# Patient Record
Sex: MALE | Race: WHITE | NOT HISPANIC OR LATINO | Employment: OTHER | URBAN - METROPOLITAN AREA
[De-identification: names, ages, dates, MRNs, and addresses within clinical notes are randomized per-mention and may not be internally consistent; named-entity substitution may affect disease eponyms.]

---

## 2017-10-12 ENCOUNTER — OFFICE VISIT (OUTPATIENT)
Dept: ENDOCRINOLOGY | Age: 67
End: 2017-10-12

## 2017-10-12 VITALS
SYSTOLIC BLOOD PRESSURE: 122 MMHG | BODY MASS INDEX: 30.45 KG/M2 | WEIGHT: 205.6 LBS | DIASTOLIC BLOOD PRESSURE: 50 MMHG | HEART RATE: 76 BPM | OXYGEN SATURATION: 96 % | HEIGHT: 69 IN

## 2017-10-12 DIAGNOSIS — Z95.820 S/P ANGIOPLASTY WITH STENT: ICD-10-CM

## 2017-10-12 DIAGNOSIS — E78.5 HYPERLIPIDEMIA, UNSPECIFIED HYPERLIPIDEMIA TYPE: ICD-10-CM

## 2017-10-12 DIAGNOSIS — E11.9 TYPE 2 DIABETES MELLITUS WITHOUT COMPLICATION, WITHOUT LONG-TERM CURRENT USE OF INSULIN (HCC): Primary | ICD-10-CM

## 2017-10-12 DIAGNOSIS — I25.10 CORONARY ARTERY DISEASE INVOLVING NATIVE CORONARY ARTERY OF NATIVE HEART WITHOUT ANGINA PECTORIS: ICD-10-CM

## 2017-10-12 DIAGNOSIS — R97.20 ELEVATED PSA: ICD-10-CM

## 2017-10-12 PROCEDURE — 99214 OFFICE O/P EST MOD 30 MIN: CPT | Performed by: INTERNAL MEDICINE

## 2017-10-12 RX ORDER — CLOPIDOGREL BISULFATE 75 MG/1
75 TABLET ORAL DAILY
COMMUNITY
Start: 2017-09-16

## 2017-10-12 RX ORDER — ROSUVASTATIN CALCIUM 20 MG/1
20 TABLET, COATED ORAL NIGHTLY
COMMUNITY
End: 2017-10-18 | Stop reason: SDUPTHER

## 2017-10-12 RX ORDER — GLIMEPIRIDE 2 MG/1
2 TABLET ORAL
Qty: 90 TABLET | Refills: 2 | Status: SHIPPED | OUTPATIENT
Start: 2017-10-12 | End: 2017-10-30 | Stop reason: SDUPTHER

## 2017-10-12 RX ORDER — LISINOPRIL 20 MG/1
20 TABLET ORAL DAILY
COMMUNITY
Start: 2017-09-16 | End: 2022-12-22 | Stop reason: SDUPTHER

## 2017-10-12 RX ORDER — EZETIMIBE 10 MG/1
10 TABLET ORAL DAILY
COMMUNITY
Start: 2017-09-16 | End: 2020-01-03 | Stop reason: ALTCHOICE

## 2017-10-12 RX ORDER — MULTIPLE VITAMINS W/ MINERALS TAB 9MG-400MCG
1 TAB ORAL DAILY
COMMUNITY

## 2017-10-12 RX ORDER — LANOLIN ALCOHOL/MO/W.PET/CERES
500 CREAM (GRAM) TOPICAL NIGHTLY
COMMUNITY
End: 2017-10-18

## 2017-10-12 RX ORDER — CHLORAL HYDRATE 500 MG
1000 CAPSULE ORAL 2 TIMES DAILY WITH MEALS
COMMUNITY

## 2017-10-12 RX ORDER — GLIMEPIRIDE 2 MG/1
1 TABLET ORAL
COMMUNITY
Start: 2017-07-29 | End: 2017-10-12 | Stop reason: SDUPTHER

## 2017-10-12 RX ORDER — CARVEDILOL 25 MG/1
25 TABLET ORAL 2 TIMES DAILY WITH MEALS
COMMUNITY
Start: 2017-09-16 | End: 2018-11-27

## 2017-10-12 NOTE — PROGRESS NOTES
Subjective   Dennis Arnold is a 67 y.o. male.     HPI Comments: New pt ref by dr Milka Peace for dm / not testing bs / last dm eye exam mar 2017 / last dm foot exam today with dr De La Cruz     Diabetes   Associated symptoms include fatigue.      Patient is a 67-year-old male who was referred for diabetes consultation by Dr. Peace.  Patient has known diabetes mellitus since 2010.  He has been on glimepiride 2 mg once a day and metformin 1000 mg twice a day.  He checks his blood sugar once every other week and runs above 200.  He denies any hypoglycemic episodes.  He is occasionally noncompliant with his diet.  He denies any significant weight change.  He does not exercise regularly.  His last meal was at 8:30 AM.    His last eye examination was in February 2017 and there is no retinopathy.  He denies any numbness, tingling or burning in his hands or feet he denies any associated nephropathy.    He has known coronary artery disease and hypertension he had a previous three-vessel coronary artery bypass and angioplasty with stent.  He is on Coreg and lisinopril.  He denies chest pain or shortness of breath.  He follows with Dr. Sullivan.    He has hyperlipidemia and has been on Trilipix 135 mg once a day, Zetia 10 mg once a day, niacin 500 mg every evening, Crestor 20 mg once a day, and fish oil 1000 mg twice a day.  He denies any flushing or myalgia.    He has elevated PSA and will have a prostate biopsy done by Dr. Azul later this month.  He denies urinary complaints.    He has never had a colonoscopy.  He denies bowel changes.  He denies melena or hematochezia.    The following portions of the patient's history were reviewed and updated as appropriate: allergies, current medications, past family history, past medical history, past social history, past surgical history and problem list.    Review of Systems   Constitutional: Positive for fatigue.   HENT: Negative.    Eyes: Negative.    Respiratory: Negative.   "  Cardiovascular: Negative.    Gastrointestinal: Negative.    Endocrine: Negative.    Genitourinary: Positive for difficulty urinating, frequency and urgency.   Musculoskeletal: Negative.    Skin: Negative.    Allergic/Immunologic: Negative.    Neurological: Negative.    Hematological: Negative.    Psychiatric/Behavioral: Negative.        Objective      Vitals:    10/12/17 1201   BP: 122/50   BP Location: Right arm   Patient Position: Sitting   Cuff Size: Large Adult   Pulse: 76   SpO2: 96%   Weight: 205 lb 9.6 oz (93.3 kg)   Height: 69\" (175.3 cm)     Physical Exam   Constitutional: He is oriented to person, place, and time. He appears well-developed and well-nourished. No distress.   HENT:   Head: Normocephalic.   Nose: Nose normal.   Mouth/Throat: No oropharyngeal exudate.   Eyes: Conjunctivae and EOM are normal. Right eye exhibits no discharge. Left eye exhibits no discharge. No scleral icterus.   Neck: Neck supple. No JVD present. No tracheal deviation present. No thyromegaly present.   No carotid bruits   Cardiovascular: Normal rate, regular rhythm, normal heart sounds and intact distal pulses.  Exam reveals no gallop and no friction rub.    No murmur heard.  Pulmonary/Chest: Effort normal and breath sounds normal. No respiratory distress. He has no wheezes. He has no rales. He exhibits no tenderness.   Abdominal: Soft. Bowel sounds are normal. He exhibits no distension and no mass. There is no tenderness.   Musculoskeletal: Normal range of motion. He exhibits no edema, tenderness or deformity.   No plantar ulcers.  No xanthomas   Lymphadenopathy:     He has no cervical adenopathy.   Neurological: He is alert and oriented to person, place, and time. He displays normal reflexes. Coordination normal.   Intact light touch on lower extremities   Skin: Skin is warm and dry. No rash noted. No erythema. No pallor.   Psychiatric: He has a normal mood and affect. His behavior is normal.     No results found for any " previous visit.  Assessment/Plan   Dennis was seen today for diabetes.    Diagnoses and all orders for this visit:    Type 2 diabetes mellitus without complication, without long-term current use of insulin  -     Comprehensive Metabolic Panel  -     Hemoglobin A1c  -     TSH  -     T4, Free  -     Urinalysis With / Microscopic If Indicated - Urine, Clean Catch  -     C-Peptide  -     Glutamic Acid Decarboxylase  -     Ambulatory Referral to Diabetic Education  -     Microalbumin / Creatinine Urine Ratio - Urine, Clean Catch  -     glimepiride (AMARYL) 2 MG tablet; Take 1 tablet by mouth Every Morning Before Breakfast.    Hyperlipidemia, unspecified hyperlipidemia type  -     Lipid Panel  -     TSH  -     T4, Free    Coronary artery disease involving native coronary artery of native heart without angina pectoris    S/P angioplasty with stent    Elevated PSA  -     Urinalysis With / Microscopic If Indicated - Urine, Clean Catch      Increase glimepiride to 2 mg once a day.  Continue metformin 1000 mg twice a day.  Check blood sugars daily, alternating fasting with 2 hours after main meal to day.  Call with blood sugars in one week.  Appointment with diabetes educator.  Check hemoglobin A1c and urine microalbumin.  Check thyroid function tests and lipid profile.  Consider simplifying lipid therapy.  Continue Coreg and lisinopril    Advised to get a colonoscopy.    Advised to get Pneumovax and shingles vaccine and tetanus whooping cough vaccine.    Send copy of my notes and labs to Dr. Peace, Dr. Sullivan, and Dr. Azul    RTC 4 mos

## 2017-10-17 LAB
ALBUMIN SERPL-MCNC: 4.8 G/DL (ref 3.5–5.2)
ALBUMIN/CREAT UR: 111.8 MG/G CREAT (ref 0–30)
ALBUMIN/GLOB SERPL: 1.7 G/DL
ALP SERPL-CCNC: 86 U/L (ref 39–117)
ALT SERPL-CCNC: 33 U/L (ref 1–41)
APPEARANCE UR: CLEAR
AST SERPL-CCNC: 38 U/L (ref 1–40)
BACTERIA #/AREA URNS HPF: NORMAL /HPF
BILIRUB SERPL-MCNC: 0.9 MG/DL (ref 0.1–1.2)
BILIRUB UR QL STRIP: NEGATIVE
BUN SERPL-MCNC: 15 MG/DL (ref 8–23)
BUN/CREAT SERPL: 16.9 (ref 7–25)
C PEPTIDE SERPL-MCNC: 7.6 NG/ML (ref 1.1–4.4)
CALCIUM SERPL-MCNC: 9.6 MG/DL (ref 8.6–10.5)
CASTS URNS MICRO: NORMAL
CHLORIDE SERPL-SCNC: 97 MMOL/L (ref 98–107)
CHOLEST SERPL-MCNC: 149 MG/DL (ref 0–200)
CO2 SERPL-SCNC: 24.7 MMOL/L (ref 22–29)
COLOR UR: YELLOW
CREAT SERPL-MCNC: 0.89 MG/DL (ref 0.76–1.27)
CREAT UR-MCNC: 112.2 MG/DL
EPI CELLS #/AREA URNS HPF: NORMAL /HPF
GAD65 AB SER IA-ACNC: <5 U/ML (ref 0–5)
GLOBULIN SER CALC-MCNC: 2.8 GM/DL
GLUCOSE SERPL-MCNC: 292 MG/DL (ref 65–99)
GLUCOSE UR QL: ABNORMAL
HBA1C MFR BLD: 8.9 % (ref 4.8–5.6)
HDLC SERPL-MCNC: 38 MG/DL (ref 40–60)
HGB UR QL STRIP: NEGATIVE
KETONES UR QL STRIP: NEGATIVE
LDLC SERPL CALC-MCNC: ABNORMAL MG/DL
LEUKOCYTE ESTERASE UR QL STRIP: NEGATIVE
MICROALBUMIN UR-MCNC: 125.4 UG/ML
NITRITE UR QL STRIP: NEGATIVE
PH UR STRIP: 5.5 [PH] (ref 5–8)
POTASSIUM SERPL-SCNC: 4.6 MMOL/L (ref 3.5–5.2)
PROT SERPL-MCNC: 7.6 G/DL (ref 6–8.5)
PROT UR QL STRIP: ABNORMAL
RBC #/AREA URNS HPF: NORMAL /HPF
SODIUM SERPL-SCNC: 137 MMOL/L (ref 136–145)
SP GR UR: ABNORMAL (ref 1–1.03)
T4 FREE SERPL-MCNC: 1.27 NG/DL (ref 0.93–1.7)
TRIGL SERPL-MCNC: 427 MG/DL (ref 0–150)
TSH SERPL DL<=0.005 MIU/L-ACNC: 1.88 MIU/ML (ref 0.27–4.2)
UROBILINOGEN UR STRIP-MCNC: ABNORMAL MG/DL
VLDLC SERPL CALC-MCNC: ABNORMAL MG/DL
WBC #/AREA URNS HPF: NORMAL /HPF

## 2017-10-18 RX ORDER — ROSUVASTATIN CALCIUM 40 MG/1
40 TABLET, COATED ORAL NIGHTLY
Qty: 30 TABLET | Refills: 5 | Status: SHIPPED | OUTPATIENT
Start: 2017-10-18 | End: 2018-03-07 | Stop reason: ALTCHOICE

## 2017-10-30 DIAGNOSIS — E11.9 TYPE 2 DIABETES MELLITUS WITHOUT COMPLICATION, WITHOUT LONG-TERM CURRENT USE OF INSULIN (HCC): ICD-10-CM

## 2017-10-30 RX ORDER — GLIMEPIRIDE 4 MG/1
4 TABLET ORAL
Qty: 90 TABLET | Refills: 1 | Status: SHIPPED | OUTPATIENT
Start: 2017-10-30 | End: 2018-03-09 | Stop reason: DRUGHIGH

## 2017-11-13 ENCOUNTER — HOSPITAL ENCOUNTER (OUTPATIENT)
Dept: DIABETES SERVICES | Facility: HOSPITAL | Age: 67
Discharge: HOME | End: 2017-11-13
Attending: INTERNAL MEDICINE | Admitting: INTERNAL MEDICINE

## 2017-11-13 PROCEDURE — G0109 DIAB MANAGE TRN IND/GROUP: HCPCS

## 2018-03-02 ENCOUNTER — OFFICE VISIT (OUTPATIENT)
Dept: ENDOCRINOLOGY | Age: 68
End: 2018-03-02

## 2018-03-02 VITALS
OXYGEN SATURATION: 96 % | HEART RATE: 67 BPM | WEIGHT: 201.6 LBS | BODY MASS INDEX: 29.86 KG/M2 | SYSTOLIC BLOOD PRESSURE: 126 MMHG | DIASTOLIC BLOOD PRESSURE: 66 MMHG | HEIGHT: 69 IN

## 2018-03-02 DIAGNOSIS — E78.5 HYPERLIPIDEMIA, UNSPECIFIED HYPERLIPIDEMIA TYPE: ICD-10-CM

## 2018-03-02 DIAGNOSIS — E11.29 TYPE 2 DIABETES MELLITUS WITH ALBUMINURIA (HCC): Primary | ICD-10-CM

## 2018-03-02 DIAGNOSIS — R80.9 TYPE 2 DIABETES MELLITUS WITH ALBUMINURIA (HCC): Primary | ICD-10-CM

## 2018-03-02 DIAGNOSIS — I25.10 CORONARY ARTERY DISEASE INVOLVING NATIVE CORONARY ARTERY OF NATIVE HEART WITHOUT ANGINA PECTORIS: ICD-10-CM

## 2018-03-02 DIAGNOSIS — Z95.820 S/P ANGIOPLASTY WITH STENT: ICD-10-CM

## 2018-03-02 DIAGNOSIS — I10 ESSENTIAL HYPERTENSION: ICD-10-CM

## 2018-03-02 PROBLEM — C61 PROSTATE CANCER (HCC): Status: ACTIVE | Noted: 2017-10-12

## 2018-03-02 LAB
ALBUMIN SERPL-MCNC: 4.6 G/DL (ref 3.5–5.2)
ALBUMIN/GLOB SERPL: 1.6 G/DL
ALP SERPL-CCNC: 112 U/L (ref 39–117)
ALT SERPL-CCNC: 41 U/L (ref 1–41)
AST SERPL-CCNC: 29 U/L (ref 1–40)
BILIRUB SERPL-MCNC: 0.6 MG/DL (ref 0.1–1.2)
BUN SERPL-MCNC: 18 MG/DL (ref 8–23)
BUN/CREAT SERPL: 25.7 (ref 7–25)
CALCIUM SERPL-MCNC: 9.5 MG/DL (ref 8.6–10.5)
CHLORIDE SERPL-SCNC: 100 MMOL/L (ref 98–107)
CHOLEST SERPL-MCNC: 193 MG/DL (ref 0–200)
CO2 SERPL-SCNC: 25.5 MMOL/L (ref 22–29)
CREAT SERPL-MCNC: 0.7 MG/DL (ref 0.76–1.27)
GFR SERPLBLD CREATININE-BSD FMLA CKD-EPI: 113 ML/MIN/1.73
GFR SERPLBLD CREATININE-BSD FMLA CKD-EPI: 137 ML/MIN/1.73
GLOBULIN SER CALC-MCNC: 2.9 GM/DL
GLUCOSE SERPL-MCNC: 202 MG/DL (ref 65–99)
HBA1C MFR BLD: 8.3 % (ref 4.8–5.6)
HDLC SERPL-MCNC: 50 MG/DL (ref 40–60)
INTERPRETATION: NORMAL
LDLC SERPL CALC-MCNC: 63 MG/DL (ref 0–100)
Lab: NORMAL
POTASSIUM SERPL-SCNC: 4.6 MMOL/L (ref 3.5–5.2)
PROT SERPL-MCNC: 7.5 G/DL (ref 6–8.5)
SODIUM SERPL-SCNC: 139 MMOL/L (ref 136–145)
T4 FREE SERPL-MCNC: 1.22 NG/DL (ref 0.93–1.7)
TRIGL SERPL-MCNC: 398 MG/DL (ref 0–150)
TSH SERPL DL<=0.005 MIU/L-ACNC: 1.7 MIU/ML (ref 0.27–4.2)
VLDLC SERPL CALC-MCNC: 79.6 MG/DL (ref 5–40)

## 2018-03-02 PROCEDURE — 99214 OFFICE O/P EST MOD 30 MIN: CPT | Performed by: INTERNAL MEDICINE

## 2018-03-02 NOTE — PROGRESS NOTES
Subjective   Dennis Arnold is a 67 y.o. male.     HPI Comments: F/u for dm 2, hyperlipidemia ,CAD/testing bs   2 x day / last dm eye exam Mar 2017/ last dm foot exam today with dr Lutz     Diabetes   Associated symptoms include fatigue.   Coronary Artery Disease   Risk factors include hyperlipidemia.   Hyperlipidemia        Patient is a 67-year-old male who was referred for diabetes consultation by Dr. Peace.  Patient has known diabetes mellitus since 2010.  He has been on glimepiride 2 mg once a day and metformin 1000 mg twice a day.  -190.  He denies any hypoglycemic episodes.  He is occasionally noncompliant with his diet.  He denies any significant weight change.  He does not exercise regularly.  His last meal was last night.     His last eye examination was in February 2017 and there is no retinopathy.  He denies any numbness, tingling or burning in his hands or feet.   He has albuminuria and urine sample taken in October 2017.  He is on lisinopril.     He has known coronary artery disease and hypertension.  He had a previous three-vessel coronary artery bypass and angioplasty with stent.  He is on Coreg and lisinopril.  He denies chest pain or shortness of breath.  He follows with Dr. Sullivan.     He has hyperlipidemia and has been on Trilipix 135 mg once a day, and  Crestor 40 mg once a day..  He denies any myalgia.     He was diagnosed to have prostate cancer and is on androgen deprivation therapy through Dr. Azul.  He will undergo external radiation therapy.  He has been having hot flushes     He has never had a colonoscopy.  He denies bowel changes.  He denies melena or hematochezia.      The following portions of the patient's history were reviewed and updated as appropriate: allergies, current medications, past family history, past medical history, past social history, past surgical history and problem list.    Review of Systems   Constitutional: Positive for fatigue.   HENT: Negative.   "  Eyes: Negative.    Respiratory: Negative.    Cardiovascular: Negative.    Gastrointestinal: Negative.    Endocrine: Negative.    Genitourinary: Negative.    Musculoskeletal: Negative.    Skin: Negative.    Allergic/Immunologic: Negative.    Neurological: Negative.    Hematological: Negative.    Psychiatric/Behavioral: Negative.        Objective      Vitals:    03/02/18 0950   BP: 126/66   BP Location: Right arm   Patient Position: Sitting   Cuff Size: Large Adult   Pulse: 67   SpO2: 96%   Weight: 91.4 kg (201 lb 9.6 oz)   Height: 175.3 cm (69.02\")     Physical Exam   Constitutional: He is oriented to person, place, and time. He appears well-developed and well-nourished. No distress.   HENT:   Head: Normocephalic.   Nose: Nose normal.   Mouth/Throat: No oropharyngeal exudate.   Eyes: Conjunctivae and EOM are normal. Right eye exhibits no discharge. Left eye exhibits no discharge. No scleral icterus.   Neck: Neck supple. No JVD present. No tracheal deviation present. No thyromegaly present.   Cardiovascular: Normal rate, regular rhythm, normal heart sounds and intact distal pulses.  Exam reveals no gallop and no friction rub.    No murmur heard.  Pulmonary/Chest: Effort normal and breath sounds normal. No respiratory distress. He has no wheezes. He has no rales.   Abdominal: Soft. Bowel sounds are normal. He exhibits no distension and no mass. There is no tenderness.   Musculoskeletal: Normal range of motion. He exhibits no edema, tenderness or deformity.   Lymphadenopathy:     He has no cervical adenopathy.   Neurological: He is alert and oriented to person, place, and time. He has normal reflexes.   Intact light touch on both lower extremities   Skin: Skin is warm and dry. No rash noted. No erythema.   Psychiatric: He has a normal mood and affect. His behavior is normal.     Office Visit on 10/12/2017   Component Date Value Ref Range Status   • Glucose 10/12/2017 292* 65 - 99 mg/dL Final   • BUN 10/12/2017 15  8 - " 23 mg/dL Final   • Creatinine 10/12/2017 0.89  0.76 - 1.27 mg/dL Final   • eGFR Non  Am 10/12/2017 86  >60 mL/min/1.73 Final   • eGFR African Am 10/12/2017 104  >60 mL/min/1.73 Final   • BUN/Creatinine Ratio 10/12/2017 16.9  7.0 - 25.0 Final   • Sodium 10/12/2017 137  136 - 145 mmol/L Final   • Potassium 10/12/2017 4.6  3.5 - 5.2 mmol/L Final   • Chloride 10/12/2017 97* 98 - 107 mmol/L Final   • Total CO2 10/12/2017 24.7  22.0 - 29.0 mmol/L Final   • Calcium 10/12/2017 9.6  8.6 - 10.5 mg/dL Final   • Total Protein 10/12/2017 7.6  6.0 - 8.5 g/dL Final   • Albumin 10/12/2017 4.80  3.50 - 5.20 g/dL Final   • Globulin 10/12/2017 2.8  gm/dL Final   • A/G Ratio 10/12/2017 1.7  g/dL Final   • Total Bilirubin 10/12/2017 0.9  0.1 - 1.2 mg/dL Final   • Alkaline Phosphatase 10/12/2017 86  39 - 117 U/L Final   • AST (SGOT) 10/12/2017 38  1 - 40 U/L Final   • ALT (SGPT) 10/12/2017 33  1 - 41 U/L Final   • Total Cholesterol 10/12/2017 149  0 - 200 mg/dL Final   • Triglycerides 10/12/2017 427* 0 - 150 mg/dL Final   • HDL Cholesterol 10/12/2017 38* 40 - 60 mg/dL Final   • VLDL Cholesterol 10/12/2017 CANCELED  mg/dL Final-Edited    Comment: Test not performed  Unable to calculate    Result canceled by the ancillary     • LDL Cholesterol  10/12/2017 CANCELED  mg/dL Final-Edited    Comment: Test not performed  Unable to calculate    Result canceled by the ancillary     • Hemoglobin A1C 10/12/2017 8.90* 4.80 - 5.60 % Final    Comment: Hemoglobin A1C Ranges:  Increased Risk for Diabetes  5.7% to 6.4%  Diabetes                     >= 6.5%  Diabetic Goal                < 7.0%     • TSH 10/12/2017 1.880  0.270 - 4.200 mIU/mL Final   • Free T4 10/12/2017 1.27  0.93 - 1.70 ng/dL Final   • Specific Gravity, UA 10/12/2017 Comment  1.005 - 1.030 Final    >=1.030   • pH, UA 10/12/2017 5.5  5.0 - 8.0 Final   • Color, UA 10/12/2017 Yellow   Final    REFERENCE RANGE: Yellow, Straw   • Appearance, UA 10/12/2017 Clear  Clear Final   •  Leukocytes, UA 10/12/2017 Negative  Negative Final   • Protein 10/12/2017 See below:* Negative Final    30 mg/dL (1+)   • Glucose, UA 10/12/2017 See below:* Negative Final    >=1000 mg/dL (3+)   • Ketones 10/12/2017 Negative  Negative Final   • Blood, UA 10/12/2017 Negative  Negative Final   • Bilirubin, UA 10/12/2017 Negative  Negative Final   • Urobilinogen, UA 10/12/2017 Comment   Final    Comment: 1.0 E.U./dL  REFERENCE RANGE: 0.2 - 1.0 E.U./dL     • Nitrite, UA 10/12/2017 Negative  Negative Final   • C-Peptide 10/12/2017 7.6* 1.1 - 4.4 ng/mL Final    C-Peptide reference interval is for fasting patients.   • PENNY-65 10/12/2017 <5.0  0.0 - 5.0 U/mL Final   • Creatinine, Urine 10/12/2017 112.2  Not Estab. mg/dL Final   • Microalbumin, Urine 10/12/2017 125.4  Not Estab. ug/mL Final   • Microalbumin/Creatinine Ratio 10/12/2017 111.8* 0.0 - 30.0 mg/g creat Final   • WBC, UA 10/12/2017 0-2  /hpf Final    REFERENCE RANGE: None Seen, 0-2   • RBC, UA 10/12/2017 0-2  /hpf Final    REFERENCE RANGE: None Seen, 0-2   • Epithelial Cells (non renal) 10/12/2017 0-2  /hpf Final    REFERENCE RANGE: None Seen, 0-2   • Cast Type 10/12/2017 Comment   Final    HYALINE CASTS, UA            3-6              /LPF       None Seen  N   • Bacteria, UA 10/12/2017 Comment  None Seen /hpf Final    None Seen     Assessment/Plan   Dennis was seen today for diabetes, coronary artery disease and hyperlipidemia.    Diagnoses and all orders for this visit:    Type 2 diabetes mellitus with albuminuria  -     Comprehensive Metabolic Panel  -     Lipid Panel  -     Hemoglobin A1c  -     TSH  -     T4, Free    Hyperlipidemia, unspecified hyperlipidemia type  -     Comprehensive Metabolic Panel  -     Lipid Panel  -     TSH  -     T4, Free    Coronary artery disease involving native coronary artery of native heart without angina pectoris    S/P angioplasty with stent    Essential hypertension  -     Comprehensive Metabolic Panel      Continue glimepiride  2 mg once a day and metformin 1000 mg twice a day.  Continue Crestor and fenofibrate.  Continue lisinopril, and Coreg.  Advised to get a colonoscopy in the near future.    Send copy of my notes and labs to Dr. Peace, Dr. Azul, and Dr. Sullivan.    RTC 4 mos

## 2018-03-07 ENCOUNTER — PRIOR AUTHORIZATION (OUTPATIENT)
Dept: ENDOCRINOLOGY | Age: 68
End: 2018-03-07

## 2018-03-07 DIAGNOSIS — I10 ESSENTIAL HYPERTENSION: Primary | ICD-10-CM

## 2018-03-07 NOTE — TELEPHONE ENCOUNTER
PT'S PA FOR JARDIANCE WAS SUBMITTED TO [employer] ON 3/8/17 AND WAS APPROVED ON 3/9/18. PHARMACY WAS NOTIFIED.

## 2018-03-09 DIAGNOSIS — E11.9 TYPE 2 DIABETES MELLITUS WITHOUT COMPLICATION, WITHOUT LONG-TERM CURRENT USE OF INSULIN (HCC): ICD-10-CM

## 2018-03-09 RX ORDER — GLIMEPIRIDE 4 MG/1
4 TABLET ORAL 2 TIMES DAILY
Qty: 90 TABLET | Refills: 1
Start: 2018-03-09 | End: 2018-11-27 | Stop reason: SDUPTHER

## 2018-03-15 ENCOUNTER — RESULTS ENCOUNTER (OUTPATIENT)
Dept: ENDOCRINOLOGY | Age: 68
End: 2018-03-15

## 2018-03-15 DIAGNOSIS — I10 ESSENTIAL HYPERTENSION: ICD-10-CM

## 2018-04-09 DIAGNOSIS — E11.9 TYPE 2 DIABETES MELLITUS WITHOUT COMPLICATION, WITHOUT LONG-TERM CURRENT USE OF INSULIN (HCC): ICD-10-CM

## 2018-04-09 RX ORDER — GLIMEPIRIDE 4 MG/1
4 TABLET ORAL
Qty: 90 TABLET | Refills: 0 | Status: SHIPPED | OUTPATIENT
Start: 2018-04-09 | End: 2018-07-11 | Stop reason: SDUPTHER

## 2018-07-11 ENCOUNTER — OFFICE VISIT (OUTPATIENT)
Dept: ENDOCRINOLOGY | Age: 68
End: 2018-07-11

## 2018-07-11 VITALS
DIASTOLIC BLOOD PRESSURE: 72 MMHG | HEIGHT: 69 IN | WEIGHT: 200 LBS | HEART RATE: 70 BPM | BODY MASS INDEX: 29.62 KG/M2 | OXYGEN SATURATION: 98 % | SYSTOLIC BLOOD PRESSURE: 124 MMHG

## 2018-07-11 DIAGNOSIS — R68.89 ABNORMAL ENDOCRINE LABORATORY TEST FINDING: ICD-10-CM

## 2018-07-11 DIAGNOSIS — I10 ESSENTIAL HYPERTENSION: ICD-10-CM

## 2018-07-11 DIAGNOSIS — R80.9 TYPE 2 DIABETES MELLITUS WITH ALBUMINURIA (HCC): Primary | ICD-10-CM

## 2018-07-11 DIAGNOSIS — E78.5 HYPERLIPIDEMIA, UNSPECIFIED HYPERLIPIDEMIA TYPE: ICD-10-CM

## 2018-07-11 DIAGNOSIS — R80.9 TYPE 2 DIABETES MELLITUS WITH ALBUMINURIA (HCC): ICD-10-CM

## 2018-07-11 DIAGNOSIS — E11.29 TYPE 2 DIABETES MELLITUS WITH ALBUMINURIA (HCC): ICD-10-CM

## 2018-07-11 DIAGNOSIS — Z95.820 S/P ANGIOPLASTY WITH STENT: ICD-10-CM

## 2018-07-11 DIAGNOSIS — E78.49 OTHER HYPERLIPIDEMIA: ICD-10-CM

## 2018-07-11 DIAGNOSIS — E11.29 TYPE 2 DIABETES MELLITUS WITH ALBUMINURIA (HCC): Primary | ICD-10-CM

## 2018-07-11 DIAGNOSIS — C61 PROSTATE CANCER (HCC): ICD-10-CM

## 2018-07-11 DIAGNOSIS — I25.10 CORONARY ARTERY DISEASE INVOLVING NATIVE CORONARY ARTERY OF NATIVE HEART WITHOUT ANGINA PECTORIS: ICD-10-CM

## 2018-07-11 DIAGNOSIS — I10 ESSENTIAL HYPERTENSION: Primary | ICD-10-CM

## 2018-07-11 PROCEDURE — 99214 OFFICE O/P EST MOD 30 MIN: CPT | Performed by: INTERNAL MEDICINE

## 2018-07-11 RX ORDER — LISINOPRIL 20 MG/1
TABLET ORAL
COMMUNITY
Start: 2018-04-09 | End: 2018-07-11 | Stop reason: SDUPTHER

## 2018-07-11 NOTE — PROGRESS NOTES
Vannesa Arnold is a 67 y.o. male.     F/u for dm 2, hyperlipidemia, CAD/ testing bs 2 x day / last dm eye exam Mar 2017/ last dm foot exam 3/2/18 with dr Lutz  / bs running 184- 210      Diabetes   Associated symptoms include fatigue.   Hyperlipidemia     Coronary Artery Disease   Risk factors include hyperlipidemia.      Patient is a 67-year-old male who was referred for diabetes consultation by Dr. Peace.  Patient has known diabetes mellitus since 2010.  He has been on glimepiride 4 mg BID and metformin 1000 mg twice a day.  He did not start on Jardiance because of a high co-pay.  -210.  After supper -210.  He denies any hypoglycemic episodes.  He is occasionally noncompliant with his diet.  He denies any significant weight change.  He exercises 3 times a week.  His last meal was 10 AM.     His last eye examination was in February 2017 and there is no retinopathy.  He denies any numbness, tingling or burning in his hands or feet.   He has albuminuria on urine sample taken in October 2017.  He is on lisinopril.     He has known coronary artery disease and hypertension.  He had a previous three-vessel coronary artery bypass and angioplasty with stent.  He is on Coreg and lisinopril.  He denies chest pain or shortness of breath.  He follows with Dr. Sullivan.     He has hyperlipidemia and has been on Zetia 10 mg/day..  He denies any myalgia.     He was diagnosed to have prostate cancer and is on androgen deprivation therapy through Dr. Azul.  He has completed external radiation therapy.  He has been having hot flushes.     He has never had a colonoscopy.  He denies bowel changes.  He denies melena or hematochezia.  He has not talked to Dr. Peace about getting a colonoscopy.    The following portions of the patient's history were reviewed and updated as appropriate: allergies, current medications, past family history, past medical history, past social history, past surgical history and  "problem list.    Review of Systems   Constitutional: Positive for fatigue.   HENT: Negative.    Eyes: Negative.    Respiratory: Negative.    Cardiovascular: Negative.    Gastrointestinal: Negative.    Endocrine: Negative.    Genitourinary: Negative.    Musculoskeletal: Negative.    Skin: Negative.    Allergic/Immunologic: Negative.    Neurological: Negative.    Hematological: Negative.    Psychiatric/Behavioral: Negative.        Objective      Vitals:    07/11/18 1453   BP: 124/72   BP Location: Right arm   Patient Position: Sitting   Cuff Size: Large Adult   Pulse: 70   SpO2: 98%   Weight: 90.7 kg (200 lb)   Height: 175.3 cm (69.02\")     Physical Exam   Constitutional: He is oriented to person, place, and time. He appears well-developed and well-nourished. No distress.   HENT:   Head: Normocephalic.   Nose: Nose normal.   Mouth/Throat: No oropharyngeal exudate.   Eyes: Conjunctivae and EOM are normal. Right eye exhibits no discharge. Left eye exhibits no discharge. No scleral icterus.   Neck: No JVD present. No tracheal deviation present. No thyromegaly present.   Cardiovascular: Normal rate, regular rhythm, normal heart sounds and intact distal pulses.  Exam reveals no gallop and no friction rub.    No murmur heard.  Pulmonary/Chest: Effort normal and breath sounds normal. No respiratory distress. He has no wheezes. He has no rales. He exhibits no tenderness.   Abdominal: Soft. Bowel sounds are normal. He exhibits no distension and no mass. There is no tenderness. There is no rebound and no guarding.   Musculoskeletal: Normal range of motion. He exhibits no edema, tenderness or deformity.   Lymphadenopathy:     He has no cervical adenopathy.   Neurological: He is alert and oriented to person, place, and time. He displays normal reflexes.   Intact light touch on both lower ext   Skin: Skin is warm and dry. No rash noted. No erythema.   Psychiatric: He has a normal mood and affect. His behavior is normal.     Office " Visit on 03/02/2018   Component Date Value Ref Range Status   • Glucose 03/02/2018 202* 65 - 99 mg/dL Final   • BUN 03/02/2018 18  8 - 23 mg/dL Final   • Creatinine 03/02/2018 0.70* 0.76 - 1.27 mg/dL Final   • eGFR Non African Am 03/02/2018 113  >60 mL/min/1.73 Final   • eGFR African Am 03/02/2018 137  >60 mL/min/1.73 Final   • BUN/Creatinine Ratio 03/02/2018 25.7* 7.0 - 25.0 Final   • Sodium 03/02/2018 139  136 - 145 mmol/L Final   • Potassium 03/02/2018 4.6  3.5 - 5.2 mmol/L Final   • Chloride 03/02/2018 100  98 - 107 mmol/L Final   • Total CO2 03/02/2018 25.5  22.0 - 29.0 mmol/L Final   • Calcium 03/02/2018 9.5  8.6 - 10.5 mg/dL Final   • Total Protein 03/02/2018 7.5  6.0 - 8.5 g/dL Final   • Albumin 03/02/2018 4.60  3.50 - 5.20 g/dL Final   • Globulin 03/02/2018 2.9  gm/dL Final   • A/G Ratio 03/02/2018 1.6  g/dL Final   • Total Bilirubin 03/02/2018 0.6  0.1 - 1.2 mg/dL Final   • Alkaline Phosphatase 03/02/2018 112  39 - 117 U/L Final   • AST (SGOT) 03/02/2018 29  1 - 40 U/L Final   • ALT (SGPT) 03/02/2018 41  1 - 41 U/L Final   • Total Cholesterol 03/02/2018 193  0 - 200 mg/dL Final   • Triglycerides 03/02/2018 398* 0 - 150 mg/dL Final   • HDL Cholesterol 03/02/2018 50  40 - 60 mg/dL Final   • VLDL Cholesterol 03/02/2018 79.6* 5 - 40 mg/dL Final   • LDL Cholesterol  03/02/2018 63  0 - 100 mg/dL Final   • Hemoglobin A1C 03/02/2018 8.30* 4.80 - 5.60 % Final    Comment: Hemoglobin A1C Ranges:  Increased Risk for Diabetes  5.7% to 6.4%  Diabetes                     >= 6.5%  Diabetic Goal                < 7.0%     • TSH 03/02/2018 1.700  0.270 - 4.200 mIU/mL Final   • Free T4 03/02/2018 1.22  0.93 - 1.70 ng/dL Final   • Interpretation 03/02/2018 Note   Final    Supplemental report is available.   • PDF Image 03/02/2018 Not applicable   Final     Assessment/Plan   Dennis was seen today for diabetes, hyperlipidemia and coronary artery disease.    Diagnoses and all orders for this visit:    Type 2 diabetes mellitus with  albuminuria (CMS/[facility])  -     Comprehensive Metabolic Panel  -     Hemoglobin A1c  -     TSH  -     T4, Free    Hyperlipidemia, unspecified hyperlipidemia type  -     Comprehensive Metabolic Panel  -     Lipid Panel    Essential hypertension  -     Comprehensive Metabolic Panel    Coronary artery disease involving native coronary artery of native heart without angina pectoris  -     Comprehensive Metabolic Panel    Prostate cancer (CMS/[facility])    S/P angioplasty with stent      Continue glimepiride and metformin  Continue Zetia.  Check lipid profile.  Continue Coreg and lisinopril.  Advised to discuss with Dr. Peace about screening for colon cancer.    Send copy of my note to Dr. Peace, Dr. Sullivan and Dr. Rolando Azul    RTC 4 mos.

## 2018-07-12 LAB
ALBUMIN SERPL-MCNC: 5 G/DL (ref 3.5–5.2)
ALBUMIN/GLOB SERPL: 1.8 G/DL
ALP SERPL-CCNC: 100 U/L (ref 39–117)
ALT SERPL-CCNC: 30 U/L (ref 1–41)
AST SERPL-CCNC: 26 U/L (ref 1–40)
BILIRUB SERPL-MCNC: 0.6 MG/DL (ref 0.1–1.2)
BUN SERPL-MCNC: 17 MG/DL (ref 8–23)
BUN/CREAT SERPL: 20.7 (ref 7–25)
CALCIUM SERPL-MCNC: 9.2 MG/DL (ref 8.6–10.5)
CHLORIDE SERPL-SCNC: 95 MMOL/L (ref 98–107)
CHOLEST SERPL-MCNC: 176 MG/DL (ref 0–200)
CO2 SERPL-SCNC: 30.7 MMOL/L (ref 22–29)
CREAT SERPL-MCNC: 0.82 MG/DL (ref 0.76–1.27)
GLOBULIN SER CALC-MCNC: 2.8 GM/DL
GLUCOSE SERPL-MCNC: 313 MG/DL (ref 65–99)
HBA1C MFR BLD: 8.8 % (ref 4.8–5.6)
HDLC SERPL-MCNC: 46 MG/DL (ref 40–60)
INTERPRETATION: NORMAL
LDLC SERPL CALC-MCNC: ABNORMAL MG/DL
Lab: NORMAL
POTASSIUM SERPL-SCNC: 4.3 MMOL/L (ref 3.5–5.2)
PROT SERPL-MCNC: 7.8 G/DL (ref 6–8.5)
SODIUM SERPL-SCNC: 138 MMOL/L (ref 136–145)
T4 FREE SERPL-MCNC: 1.32 NG/DL (ref 0.93–1.7)
TRIGL SERPL-MCNC: 437 MG/DL (ref 0–150)
TSH SERPL DL<=0.005 MIU/L-ACNC: 1.56 MIU/ML (ref 0.27–4.2)
VLDLC SERPL CALC-MCNC: ABNORMAL MG/DL

## 2018-07-23 DIAGNOSIS — E11.9 TYPE 2 DIABETES MELLITUS WITHOUT COMPLICATION, WITHOUT LONG-TERM CURRENT USE OF INSULIN (HCC): ICD-10-CM

## 2018-07-23 RX ORDER — GLIMEPIRIDE 4 MG/1
TABLET ORAL
Qty: 90 TABLET | Refills: 0 | Status: SHIPPED | OUTPATIENT
Start: 2018-07-23 | End: 2018-11-07 | Stop reason: SDUPTHER

## 2018-07-26 ENCOUNTER — TELEPHONE (OUTPATIENT)
Dept: ENDOCRINOLOGY | Age: 68
End: 2018-07-26

## 2018-07-26 NOTE — TELEPHONE ENCOUNTER
7/26 sent in the [facility] to the pharmacy       ----- Message from Juanis Tiwari sent at 7/23/2018 11:19 AM EDT -----  Contact: PATIENT  PATIENT WAS CALLING IN REGARDS TO HIS OFFICE VISIT ON 7-11. PATIENT THOUGHT HE WAS SUPPOSE TO BE PRESCRIBED SOME NEW MEDICATION AND WAS CALLING TO CHECK THE STATUS OF THAT BECAUSE CALLED THE PHARMACY AND THEY DIDN'T HAVE ANYTHING FOR HIM.    [facility] [phone]

## 2018-11-07 DIAGNOSIS — E11.9 TYPE 2 DIABETES MELLITUS WITHOUT COMPLICATION, WITHOUT LONG-TERM CURRENT USE OF INSULIN (HCC): ICD-10-CM

## 2018-11-07 RX ORDER — GLIMEPIRIDE 4 MG/1
4 TABLET ORAL 2 TIMES DAILY
Qty: 180 TABLET | Refills: 1 | Status: SHIPPED | OUTPATIENT
Start: 2018-11-07 | End: 2019-05-30 | Stop reason: SDUPTHER

## 2018-11-20 ENCOUNTER — RESULTS ENCOUNTER (OUTPATIENT)
Dept: ENDOCRINOLOGY | Age: 68
End: 2018-11-20

## 2018-11-20 DIAGNOSIS — E11.29 TYPE 2 DIABETES MELLITUS WITH ALBUMINURIA (HCC): ICD-10-CM

## 2018-11-20 DIAGNOSIS — R68.89 ABNORMAL ENDOCRINE LABORATORY TEST FINDING: ICD-10-CM

## 2018-11-20 DIAGNOSIS — R80.9 TYPE 2 DIABETES MELLITUS WITH ALBUMINURIA (HCC): ICD-10-CM

## 2018-11-20 DIAGNOSIS — I10 ESSENTIAL HYPERTENSION: ICD-10-CM

## 2018-11-20 DIAGNOSIS — E78.49 OTHER HYPERLIPIDEMIA: ICD-10-CM

## 2018-11-20 LAB
ALBUMIN SERPL-MCNC: 4.6 G/DL (ref 3.5–5.2)
ALBUMIN/GLOB SERPL: 1.7 G/DL
ALP SERPL-CCNC: 91 U/L (ref 39–117)
ALT SERPL-CCNC: 41 U/L (ref 1–41)
AST SERPL-CCNC: 45 U/L (ref 1–40)
BILIRUB SERPL-MCNC: 0.7 MG/DL (ref 0.1–1.2)
BUN SERPL-MCNC: 16 MG/DL (ref 8–23)
BUN/CREAT SERPL: 20.3 (ref 7–25)
CALCIUM SERPL-MCNC: 10.3 MG/DL (ref 8.6–10.5)
CHLORIDE SERPL-SCNC: 100 MMOL/L (ref 98–107)
CHOLEST SERPL-MCNC: 147 MG/DL (ref 0–200)
CO2 SERPL-SCNC: 26 MMOL/L (ref 22–29)
CREAT SERPL-MCNC: 0.79 MG/DL (ref 0.76–1.27)
GLOBULIN SER CALC-MCNC: 2.7 GM/DL
GLUCOSE SERPL-MCNC: 190 MG/DL (ref 65–99)
HBA1C MFR BLD: 8 % (ref 4.8–5.6)
HDLC SERPL-MCNC: 42 MG/DL (ref 40–60)
INTERPRETATION: NORMAL
LDLC SERPL CALC-MCNC: 43 MG/DL (ref 0–100)
Lab: NORMAL
POTASSIUM SERPL-SCNC: 4.4 MMOL/L (ref 3.5–5.2)
PROT SERPL-MCNC: 7.3 G/DL (ref 6–8.5)
SODIUM SERPL-SCNC: 140 MMOL/L (ref 136–145)
T4 FREE SERPL-MCNC: 1.33 NG/DL (ref 0.93–1.7)
TRIGL SERPL-MCNC: 312 MG/DL (ref 0–150)
TSH SERPL DL<=0.005 MIU/L-ACNC: 1.91 MIU/ML (ref 0.27–4.2)
VLDLC SERPL CALC-MCNC: 62.4 MG/DL (ref 5–40)

## 2018-11-27 ENCOUNTER — OFFICE VISIT (OUTPATIENT)
Dept: ENDOCRINOLOGY | Age: 68
End: 2018-11-27

## 2018-11-27 VITALS
BODY MASS INDEX: 30.36 KG/M2 | WEIGHT: 205 LBS | DIASTOLIC BLOOD PRESSURE: 64 MMHG | SYSTOLIC BLOOD PRESSURE: 126 MMHG | HEIGHT: 69 IN | OXYGEN SATURATION: 95 % | HEART RATE: 91 BPM

## 2018-11-27 DIAGNOSIS — E78.5 HYPERLIPIDEMIA, UNSPECIFIED HYPERLIPIDEMIA TYPE: ICD-10-CM

## 2018-11-27 DIAGNOSIS — R80.9 TYPE 2 DIABETES MELLITUS WITH ALBUMINURIA (HCC): Primary | ICD-10-CM

## 2018-11-27 DIAGNOSIS — Z95.820 S/P ANGIOPLASTY WITH STENT: ICD-10-CM

## 2018-11-27 DIAGNOSIS — I10 ESSENTIAL HYPERTENSION: ICD-10-CM

## 2018-11-27 DIAGNOSIS — I25.10 CORONARY ARTERY DISEASE INVOLVING NATIVE CORONARY ARTERY OF NATIVE HEART WITHOUT ANGINA PECTORIS: ICD-10-CM

## 2018-11-27 DIAGNOSIS — E11.29 TYPE 2 DIABETES MELLITUS WITH ALBUMINURIA (HCC): Primary | ICD-10-CM

## 2018-11-27 PROCEDURE — 99214 OFFICE O/P EST MOD 30 MIN: CPT | Performed by: INTERNAL MEDICINE

## 2018-11-27 RX ORDER — ROSUVASTATIN CALCIUM 20 MG/1
20 TABLET, COATED ORAL DAILY
COMMUNITY
End: 2020-01-03 | Stop reason: SDUPTHER

## 2018-11-27 RX ORDER — BETAMETHASONE DIPROPIONATE 0.5 MG/G
CREAM TOPICAL 2 TIMES DAILY
Qty: 45 G | Refills: 0 | Status: SHIPPED | OUTPATIENT
Start: 2018-11-27 | End: 2020-01-03

## 2018-11-27 NOTE — PROGRESS NOTES
Vannesa Arnold is a 68 y.o. male.     F/u for dm 2, hyperlipidemia, CAD/ testing bs 2 x day / last dm eye exam sept 2018 / last dm foot exam 3/2/18 with dr Lutz / flu vaccine declined       Diabetes   He presents for his follow-up diabetic visit. He has type 2 diabetes mellitus. Associated symptoms include fatigue.   Hyperlipidemia     Coronary Artery Disease   Risk factors include hyperlipidemia.      Patient is a 68-year-old male who was referred for diabetes consultation by Dr. Peace.    Patient has known diabetes mellitus since 2010.  He has been on glimepiride 4 mg BID, Januvia 100 mg/day and metformin 1000 mg twice a day.  He did not start on Jardiance because of a high co-pay.  -180.  After supper -180.  He denies any hypoglycemic episodes.  He is occasionally noncompliant with his diet.  He has gained 5 pounds since July 2018.  He exercises 3 times a week.  hemoglobin A1c done last week was 8.0%.     His last eye examination was in February 2017 and there is no retinopathy.  He denies any numbness, tingling or burning in his hands or feet.   He has albuminuria on urine sample taken in October 2017.  He is on lisinopril.     He has known coronary artery disease and hypertension.  He had a previous three-vessel coronary artery bypass and angioplasty with stent.  He is on Coreg and lisinopril.  He denies chest pain or shortness of breath.  He follows with Dr. Sullivan.     He has hyperlipidemia and has been on Crestor 20 mg/day and Zetia 10 mg/day..  He denies any myalgia.  Lipid profile done on November 20, 2018 as follows: Cholesterol 147.  HDL 42.  LDL 43.     He was diagnosed to have prostate cancer.  He has completed external radiation therapy.  He has stopped androgen deprivation therapy.     He has never had a colonoscopy.  He denies bowel changes.  He denies melena or hematochezia.  He has not talked to Dr. Peace about getting a colonoscopy.    He has intermittent pruritic  "rash on the right calf area over the past 6 months which improves with Neosporin cream.    The following portions of the patient's history were reviewed and updated as appropriate: allergies, current medications, past family history, past medical history, past social history, past surgical history and problem list.    Review of Systems   Constitutional: Positive for fatigue.   HENT: Negative.    Eyes: Negative.    Respiratory: Negative.    Cardiovascular: Negative.    Gastrointestinal: Negative.    Endocrine: Negative.    Genitourinary: Negative.    Musculoskeletal: Negative.    Skin: Positive for rash (right leg ).   Allergic/Immunologic: Negative.    Neurological: Negative.    Hematological: Negative.    Psychiatric/Behavioral: Negative.        Objective      Vitals:    11/27/18 1152   BP: 126/64   BP Location: Right arm   Patient Position: Sitting   Cuff Size: Large Adult   Pulse: 91   SpO2: 95%   Weight: 93 kg (205 lb)   Height: 175.3 cm (69.02\")     Physical Exam   Constitutional: He is oriented to person, place, and time. He appears well-developed and well-nourished. No distress.   HENT:   Head: Normocephalic.   Nose: Nose normal.   Mouth/Throat: No oropharyngeal exudate.   Eyes: Conjunctivae and EOM are normal. Right eye exhibits no discharge. Left eye exhibits no discharge. No scleral icterus.   Neck: Neck supple. No JVD present. No tracheal deviation present. No thyromegaly present.   Cardiovascular: Normal rate, regular rhythm, normal heart sounds and intact distal pulses. Exam reveals no gallop and no friction rub.   No murmur heard.  Pulmonary/Chest: Effort normal and breath sounds normal. No stridor. No respiratory distress. He has no wheezes. He has no rales.   Abdominal: Soft. Bowel sounds are normal. He exhibits no distension and no mass. There is no tenderness. There is no rebound and no guarding.   Musculoskeletal: Normal range of motion. He exhibits no edema or deformity.   Dry scaly skin on right " calf   Lymphadenopathy:     He has no cervical adenopathy.   Neurological: He is alert and oriented to person, place, and time. He displays normal reflexes.   Intact light touch in distal lower ext   Psychiatric: He has a normal mood and affect. His behavior is normal.     Results Encounter on 11/20/2018   Component Date Value Ref Range Status   • Glucose 11/20/2018 190* 65 - 99 mg/dL Final   • BUN 11/20/2018 16  8 - 23 mg/dL Final   • Creatinine 11/20/2018 0.79  0.76 - 1.27 mg/dL Final   • eGFR Non  Am 11/20/2018 98  >60 mL/min/1.73 Final   • eGFR African Am 11/20/2018 119  >60 mL/min/1.73 Final   • BUN/Creatinine Ratio 11/20/2018 20.3  7.0 - 25.0 Final   • Sodium 11/20/2018 140  136 - 145 mmol/L Final   • Potassium 11/20/2018 4.4  3.5 - 5.2 mmol/L Final   • Chloride 11/20/2018 100  98 - 107 mmol/L Final   • Total CO2 11/20/2018 26.0  22.0 - 29.0 mmol/L Final   • Calcium 11/20/2018 10.3  8.6 - 10.5 mg/dL Final   • Total Protein 11/20/2018 7.3  6.0 - 8.5 g/dL Final   • Albumin 11/20/2018 4.60  3.50 - 5.20 g/dL Final   • Globulin 11/20/2018 2.7  gm/dL Final   • A/G Ratio 11/20/2018 1.7  g/dL Final   • Total Bilirubin 11/20/2018 0.7  0.1 - 1.2 mg/dL Final   • Alkaline Phosphatase 11/20/2018 91  39 - 117 U/L Final   • AST (SGOT) 11/20/2018 45* 1 - 40 U/L Final   • ALT (SGPT) 11/20/2018 41  1 - 41 U/L Final   • Total Cholesterol 11/20/2018 147  0 - 200 mg/dL Final   • Triglycerides 11/20/2018 312* 0 - 150 mg/dL Final   • HDL Cholesterol 11/20/2018 42  40 - 60 mg/dL Final   • VLDL Cholesterol 11/20/2018 62.4* 5 - 40 mg/dL Final   • LDL Cholesterol  11/20/2018 43  0 - 100 mg/dL Final   • Free T4 11/20/2018 1.33  0.93 - 1.70 ng/dL Final   • TSH 11/20/2018 1.910  0.270 - 4.200 mIU/mL Final   • Hemoglobin A1C 11/20/2018 8.00* 4.80 - 5.60 % Final    Comment: Hemoglobin A1C Ranges:  Increased Risk for Diabetes  5.7% to 6.4%  Diabetes                     >= 6.5%  Diabetic Goal                < 7.0%     • Interpretation  11/20/2018 Note   Final    Supplemental report is available.   • PDF Image 11/20/2018 Not applicable   Final     Assessment/Plan   Dennis was seen today for diabetes, hyperlipidemia and coronary artery disease.    Diagnoses and all orders for this visit:    Type 2 diabetes mellitus with albuminuria (CMS/[facility])    Essential hypertension    Hyperlipidemia, unspecified hyperlipidemia type    Coronary artery disease involving native coronary artery of native heart without angina pectoris    S/P angioplasty with stent    Other orders  -     betamethasone dipropionate (DIPROLENE) 0.05 % cream; Apply  topically to the appropriate area as directed 2 (Two) Times a Day.      Start Invokana 100 mg once a day.  Keep well hydrated  Continue glimepiride 4 mg twice a day, metformin 1000 mg twice a day, and Januvia 100 mg once a day.  Continue lisinopril 20 mg once a day  Continue Crestor 20 mg once a day and Zetia 10 mg once a day.  Continue Plavix and aspirin per cardiologist.  Betamethasone cream twice a day to right calf.  Follow-up with Dr. Peace if not better in 2 weeks.    Send copy of my note to Dr. Peace    RTC 4 mos

## 2018-12-28 RX ORDER — PERPHENAZINE 16 MG/1
TABLET, FILM COATED ORAL
Qty: 200 EACH | Refills: 1 | Status: SHIPPED | OUTPATIENT
Start: 2018-12-28 | End: 2019-06-10 | Stop reason: SDUPTHER

## 2019-03-19 DIAGNOSIS — I10 ESSENTIAL HYPERTENSION: Primary | ICD-10-CM

## 2019-03-19 DIAGNOSIS — E11.29 TYPE 2 DIABETES MELLITUS WITH ALBUMINURIA (HCC): ICD-10-CM

## 2019-03-19 DIAGNOSIS — E78.5 HYPERLIPIDEMIA, UNSPECIFIED HYPERLIPIDEMIA TYPE: ICD-10-CM

## 2019-03-19 DIAGNOSIS — R80.9 TYPE 2 DIABETES MELLITUS WITH ALBUMINURIA (HCC): ICD-10-CM

## 2019-03-26 ENCOUNTER — RESULTS ENCOUNTER (OUTPATIENT)
Dept: ENDOCRINOLOGY | Age: 69
End: 2019-03-26

## 2019-03-26 DIAGNOSIS — E78.5 HYPERLIPIDEMIA, UNSPECIFIED HYPERLIPIDEMIA TYPE: ICD-10-CM

## 2019-03-26 DIAGNOSIS — R80.9 TYPE 2 DIABETES MELLITUS WITH ALBUMINURIA (HCC): ICD-10-CM

## 2019-03-26 DIAGNOSIS — I10 ESSENTIAL HYPERTENSION: ICD-10-CM

## 2019-03-26 DIAGNOSIS — E11.29 TYPE 2 DIABETES MELLITUS WITH ALBUMINURIA (HCC): ICD-10-CM

## 2019-03-27 LAB
ALBUMIN SERPL-MCNC: 4.6 G/DL (ref 3.5–5.2)
ALBUMIN/CREAT UR: 560.1 MG/G CREAT (ref 0–30)
ALBUMIN/GLOB SERPL: 1.8 G/DL
ALP SERPL-CCNC: 89 U/L (ref 39–117)
ALT SERPL-CCNC: 31 U/L (ref 1–41)
AST SERPL-CCNC: 40 U/L (ref 1–40)
BILIRUB SERPL-MCNC: 0.8 MG/DL (ref 0.2–1.2)
BUN SERPL-MCNC: 12 MG/DL (ref 8–23)
BUN/CREAT SERPL: 17.4 (ref 7–25)
CALCIUM SERPL-MCNC: 9.8 MG/DL (ref 8.6–10.5)
CHLORIDE SERPL-SCNC: 101 MMOL/L (ref 98–107)
CHOLEST SERPL-MCNC: 146 MG/DL (ref 0–200)
CO2 SERPL-SCNC: 27.3 MMOL/L (ref 22–29)
CREAT SERPL-MCNC: 0.69 MG/DL (ref 0.76–1.27)
CREAT UR-MCNC: 147.7 MG/DL
GLOBULIN SER CALC-MCNC: 2.5 GM/DL
GLUCOSE SERPL-MCNC: 208 MG/DL (ref 65–99)
HBA1C MFR BLD: 8.84 % (ref 4.8–5.6)
HDLC SERPL-MCNC: 42 MG/DL (ref 40–60)
INTERPRETATION: NORMAL
LDLC SERPL CALC-MCNC: 53 MG/DL (ref 0–100)
Lab: NORMAL
MICROALBUMIN UR-MCNC: 827.2 UG/ML
POTASSIUM SERPL-SCNC: 4.3 MMOL/L (ref 3.5–5.2)
PROT SERPL-MCNC: 7.1 G/DL (ref 6–8.5)
SODIUM SERPL-SCNC: 142 MMOL/L (ref 136–145)
TRIGL SERPL-MCNC: 257 MG/DL (ref 0–150)
VLDLC SERPL CALC-MCNC: 51.4 MG/DL (ref 5–40)

## 2019-03-28 RX ORDER — SITAGLIPTIN 100 MG/1
TABLET, FILM COATED ORAL
Qty: 30 TABLET | Refills: 4 | Status: SHIPPED | OUTPATIENT
Start: 2019-03-28 | End: 2020-01-03

## 2019-04-08 NOTE — PROGRESS NOTES
Subjective   Dennis Arnold is a 68 y.o. male.     F/u for dm 2, hyperlipidemia, CAD/ testing bs 2 x day / last dm eye exam Sept 2018/ last dm foot exam today with dr Lutz        Patient is a 68-year-old male who came in for follow-up     Patient has known diabetes mellitus since 2010.  He has been on glimepiride 4 mg BID, Januvia 100 mg/day and metformin 1000 mg twice a day.  He did not start on Jardiance because of a high co-pay.  -200. Bedtime -190.  He denies any hypoglycemic episodes.  He is occasionally noncompliant with his diet.  He has no significant weight change since March 2019.   Hexercises 3 times a week.  Hemoglobin A1c done in 8.84%.     His last eye examination was in 2018 and there is no retinopathy.  He denies any numbness, tingling or burning in his hands or feet.   He has albuminuria on urine sample taken in 3/19.  He is on lisinopril.     He has known coronary artery disease and hypertension.  He had a previous three-vessel coronary artery bypass and angioplasty with stent.  He is on Coreg and lisinopril.  He denies chest pain or shortness of breath.  He follows with Dr. Sullivan.     He has hyperlipidemia and has been on Crestor 20 mg/day and Zetia 10 mg/day..  He denies any myalgia.  Lipid profile done on November 20, 2018 as follows: Cholesterol 147.  HDL 42.  LDL 43.     He was diagnosed to have prostate cancer.  He has completed external radiation therapy.  He has stopped androgen deprivation therapy.     He has never had a colonoscopy.  He denies bowel changes.  He denies melena or hematochezia.  He has not talked to Dr. Peace about getting a colonoscopy.       The following portions of the patient's history were reviewed and updated as appropriate: allergies, current medications, past family history, past medical history, past social history, past surgical history and problem list.    Review of Systems   Constitutional: Negative.    HENT: Negative.    Eyes: Negative.   "  Respiratory: Negative.    Cardiovascular: Negative.    Gastrointestinal: Negative.    Endocrine: Negative.    Genitourinary: Negative.    Musculoskeletal: Negative.    Skin: Negative.    Allergic/Immunologic: Negative.    Neurological: Negative.    Hematological: Negative.    Psychiatric/Behavioral: Negative.        Objective      Vitals:    04/09/19 1453   BP: 146/80   BP Location: Right arm   Patient Position: Sitting   Cuff Size: Large Adult   Pulse: 84   SpO2: 96%   Weight: 92.8 kg (204 lb 9.6 oz)   Height: 175.3 cm (69.02\")     Physical Exam   Constitutional: He is oriented to person, place, and time. He appears well-developed and well-nourished. No distress.   HENT:   Head: Normocephalic.   Nose: Nose normal.   Mouth/Throat: No oropharyngeal exudate.   Eyes: Conjunctivae and EOM are normal. Right eye exhibits no discharge. Left eye exhibits no discharge. No scleral icterus.   Neck: Neck supple. No JVD present. No tracheal deviation present. No thyromegaly present.   Cardiovascular: Normal rate, regular rhythm, normal heart sounds and intact distal pulses. Exam reveals no gallop and no friction rub.   No murmur heard.  Pulmonary/Chest: Effort normal and breath sounds normal. No stridor. No respiratory distress. He has no wheezes. He has no rales. He exhibits no tenderness.   Abdominal: Soft. Bowel sounds are normal. He exhibits no distension and no mass. There is no tenderness. There is no guarding.   Musculoskeletal: Normal range of motion. He exhibits no edema, tenderness or deformity.   Lymphadenopathy:     He has no cervical adenopathy.   Neurological: He is alert and oriented to person, place, and time. He displays normal reflexes. Coordination normal.   Intact light touch on lower ext   Skin: Skin is warm and dry. No rash noted. He is not diaphoretic. No erythema.   Psychiatric: He has a normal mood and affect. His behavior is normal.     Results Encounter on 03/26/2019   Component Date Value Ref Range " Status   • Glucose 03/26/2019 208* 65 - 99 mg/dL Final   • BUN 03/26/2019 12  8 - 23 mg/dL Final   • Creatinine 03/26/2019 0.69* 0.76 - 1.27 mg/dL Final   • eGFR Non African Am 03/26/2019 114  >60 mL/min/1.73 Final   • eGFR African Am 03/26/2019 139  >60 mL/min/1.73 Final   • BUN/Creatinine Ratio 03/26/2019 17.4  7.0 - 25.0 Final   • Sodium 03/26/2019 142  136 - 145 mmol/L Final   • Potassium 03/26/2019 4.3  3.5 - 5.2 mmol/L Final   • Chloride 03/26/2019 101  98 - 107 mmol/L Final   • Total CO2 03/26/2019 27.3  22.0 - 29.0 mmol/L Final   • Calcium 03/26/2019 9.8  8.6 - 10.5 mg/dL Final   • Total Protein 03/26/2019 7.1  6.0 - 8.5 g/dL Final   • Albumin 03/26/2019 4.60  3.50 - 5.20 g/dL Final   • Globulin 03/26/2019 2.5  gm/dL Final   • A/G Ratio 03/26/2019 1.8  g/dL Final   • Total Bilirubin 03/26/2019 0.8  0.2 - 1.2 mg/dL Final   • Alkaline Phosphatase 03/26/2019 89  39 - 117 U/L Final   • AST (SGOT) 03/26/2019 40  1 - 40 U/L Final   • ALT (SGPT) 03/26/2019 31  1 - 41 U/L Final   • Total Cholesterol 03/26/2019 146  0 - 200 mg/dL Final   • Triglycerides 03/26/2019 257* 0 - 150 mg/dL Final   • HDL Cholesterol 03/26/2019 42  40 - 60 mg/dL Final   • VLDL Cholesterol 03/26/2019 51.4* 5 - 40 mg/dL Final   • LDL Cholesterol  03/26/2019 53  0 - 100 mg/dL Final   • Hemoglobin A1C 03/26/2019 8.84* 4.80 - 5.60 % Final    Comment: Hemoglobin A1C Ranges:  Increased Risk for Diabetes  5.7% to 6.4%  Diabetes                     >= 6.5%  Diabetic Goal                < 7.0%     • Creatinine, Urine 03/26/2019 147.7  Not Estab. mg/dL Final   • Microalbumin, Urine 03/26/2019 827.2  Not Estab. ug/mL Final    Comment: Results confirmed on  dilution.     • Microalbumin/Creatinine Ratio 03/26/2019 560.1* 0.0 - 30.0 mg/g creat Final    Comment:                      Normal:                0.0 -  30.0                       Albuminuria:          31.0 - 300.0                       Clinical albuminuria:       >300.0     • Interpretation  03/26/2019 Note   Final    Supplemental report is available.   • PDF Image 03/26/2019 Not applicable   Final     Assessment/Plan   Dennis was seen today for diabetes, hyperlipidemia and coronary artery disease.    Diagnoses and all orders for this visit:    Type 2 diabetes mellitus with albuminuria (CMS/HCC)    Coronary artery disease involving native coronary artery of native heart without angina pectoris    Other hyperlipidemia    Essential hypertension    S/P angioplasty with stent      Start Trulicity 0.75 mg weekly   Continue metformin, glimepiride, and Januvia.  Continue lisinopril and Coreg.  Continue Crestor, Zetia, and fish oil  Advised to have a colonoscopy    Send copy of my note to Dr. Peace.    RTC 4 mos.

## 2019-04-09 ENCOUNTER — OFFICE VISIT (OUTPATIENT)
Dept: ENDOCRINOLOGY | Age: 69
End: 2019-04-09

## 2019-04-09 VITALS
OXYGEN SATURATION: 96 % | HEIGHT: 69 IN | WEIGHT: 204.6 LBS | SYSTOLIC BLOOD PRESSURE: 146 MMHG | DIASTOLIC BLOOD PRESSURE: 80 MMHG | BODY MASS INDEX: 30.3 KG/M2 | HEART RATE: 84 BPM

## 2019-04-09 DIAGNOSIS — Z95.820 S/P ANGIOPLASTY WITH STENT: ICD-10-CM

## 2019-04-09 DIAGNOSIS — E11.29 TYPE 2 DIABETES MELLITUS WITH ALBUMINURIA (HCC): Primary | ICD-10-CM

## 2019-04-09 DIAGNOSIS — I25.10 CORONARY ARTERY DISEASE INVOLVING NATIVE CORONARY ARTERY OF NATIVE HEART WITHOUT ANGINA PECTORIS: ICD-10-CM

## 2019-04-09 DIAGNOSIS — I10 ESSENTIAL HYPERTENSION: ICD-10-CM

## 2019-04-09 DIAGNOSIS — E78.49 OTHER HYPERLIPIDEMIA: ICD-10-CM

## 2019-04-09 DIAGNOSIS — R80.9 TYPE 2 DIABETES MELLITUS WITH ALBUMINURIA (HCC): Primary | ICD-10-CM

## 2019-04-09 PROCEDURE — 99214 OFFICE O/P EST MOD 30 MIN: CPT | Performed by: INTERNAL MEDICINE

## 2019-05-30 DIAGNOSIS — E11.9 TYPE 2 DIABETES MELLITUS WITHOUT COMPLICATION, WITHOUT LONG-TERM CURRENT USE OF INSULIN (HCC): ICD-10-CM

## 2019-05-30 RX ORDER — GLIMEPIRIDE 4 MG/1
TABLET ORAL
Qty: 180 TABLET | Refills: 0 | Status: SHIPPED | OUTPATIENT
Start: 2019-05-30 | End: 2019-09-07 | Stop reason: SDUPTHER

## 2019-06-11 RX ORDER — PERPHENAZINE 16 MG/1
TABLET, FILM COATED ORAL
Qty: 200 EACH | Refills: 1 | Status: SHIPPED | OUTPATIENT
Start: 2019-06-11 | End: 2020-01-10 | Stop reason: SDUPTHER

## 2019-07-02 RX ORDER — DULAGLUTIDE 0.75 MG/.5ML
INJECTION, SOLUTION SUBCUTANEOUS
Qty: 4 PEN | Refills: 1 | Status: SHIPPED | OUTPATIENT
Start: 2019-07-02 | End: 2019-08-27 | Stop reason: SDUPTHER

## 2019-08-09 DIAGNOSIS — E11.29 TYPE 2 DIABETES MELLITUS WITH ALBUMINURIA (HCC): ICD-10-CM

## 2019-08-09 DIAGNOSIS — I10 ESSENTIAL HYPERTENSION: ICD-10-CM

## 2019-08-09 DIAGNOSIS — I25.10 CORONARY ARTERY DISEASE INVOLVING NATIVE CORONARY ARTERY OF NATIVE HEART WITHOUT ANGINA PECTORIS: Primary | ICD-10-CM

## 2019-08-09 DIAGNOSIS — E78.49 OTHER HYPERLIPIDEMIA: ICD-10-CM

## 2019-08-09 DIAGNOSIS — R80.9 TYPE 2 DIABETES MELLITUS WITH ALBUMINURIA (HCC): ICD-10-CM

## 2019-08-16 ENCOUNTER — RESULTS ENCOUNTER (OUTPATIENT)
Dept: ENDOCRINOLOGY | Age: 69
End: 2019-08-16

## 2019-08-16 DIAGNOSIS — E78.49 OTHER HYPERLIPIDEMIA: ICD-10-CM

## 2019-08-16 DIAGNOSIS — R80.9 TYPE 2 DIABETES MELLITUS WITH ALBUMINURIA (HCC): ICD-10-CM

## 2019-08-16 DIAGNOSIS — I10 ESSENTIAL HYPERTENSION: ICD-10-CM

## 2019-08-16 DIAGNOSIS — E11.29 TYPE 2 DIABETES MELLITUS WITH ALBUMINURIA (HCC): ICD-10-CM

## 2019-08-17 LAB
ALBUMIN SERPL-MCNC: 4.6 G/DL (ref 3.5–5.2)
ALBUMIN/GLOB SERPL: 1.8 G/DL
ALP SERPL-CCNC: 99 U/L (ref 39–117)
ALT SERPL-CCNC: 32 U/L (ref 1–41)
AST SERPL-CCNC: 36 U/L (ref 1–40)
BILIRUB SERPL-MCNC: 0.8 MG/DL (ref 0.2–1.2)
BUN SERPL-MCNC: 12 MG/DL (ref 8–23)
BUN/CREAT SERPL: 16 (ref 7–25)
CALCIUM SERPL-MCNC: 9.3 MG/DL (ref 8.6–10.5)
CHLORIDE SERPL-SCNC: 99 MMOL/L (ref 98–107)
CHOLEST SERPL-MCNC: 148 MG/DL (ref 0–200)
CO2 SERPL-SCNC: 27.8 MMOL/L (ref 22–29)
CREAT SERPL-MCNC: 0.75 MG/DL (ref 0.76–1.27)
GLOBULIN SER CALC-MCNC: 2.5 GM/DL
GLUCOSE SERPL-MCNC: 139 MG/DL (ref 65–99)
HBA1C MFR BLD: 8.3 % (ref 4.8–5.6)
HDLC SERPL-MCNC: 41 MG/DL (ref 40–60)
INTERPRETATION: NORMAL
LDLC SERPL CALC-MCNC: 59 MG/DL (ref 0–100)
Lab: NORMAL
POTASSIUM SERPL-SCNC: 3.8 MMOL/L (ref 3.5–5.2)
PROT SERPL-MCNC: 7.1 G/DL (ref 6–8.5)
SODIUM SERPL-SCNC: 140 MMOL/L (ref 136–145)
TRIGL SERPL-MCNC: 241 MG/DL (ref 0–150)
VLDLC SERPL CALC-MCNC: 48.2 MG/DL

## 2019-08-27 RX ORDER — DULAGLUTIDE 0.75 MG/.5ML
INJECTION, SOLUTION SUBCUTANEOUS
Qty: 4 PEN | Refills: 3 | Status: SHIPPED | OUTPATIENT
Start: 2019-08-27 | End: 2019-09-03 | Stop reason: DRUGHIGH

## 2019-09-03 ENCOUNTER — OFFICE VISIT (OUTPATIENT)
Dept: ENDOCRINOLOGY | Age: 69
End: 2019-09-03

## 2019-09-03 VITALS
HEART RATE: 81 BPM | OXYGEN SATURATION: 98 % | WEIGHT: 201.6 LBS | HEIGHT: 69 IN | BODY MASS INDEX: 29.86 KG/M2 | DIASTOLIC BLOOD PRESSURE: 68 MMHG | SYSTOLIC BLOOD PRESSURE: 122 MMHG

## 2019-09-03 DIAGNOSIS — I10 ESSENTIAL HYPERTENSION: ICD-10-CM

## 2019-09-03 DIAGNOSIS — I25.10 CORONARY ARTERY DISEASE INVOLVING NATIVE CORONARY ARTERY OF NATIVE HEART WITHOUT ANGINA PECTORIS: ICD-10-CM

## 2019-09-03 DIAGNOSIS — Z95.820 S/P ANGIOPLASTY WITH STENT: ICD-10-CM

## 2019-09-03 DIAGNOSIS — C61 PROSTATE CANCER (HCC): ICD-10-CM

## 2019-09-03 DIAGNOSIS — R80.9 TYPE 2 DIABETES MELLITUS WITH ALBUMINURIA (HCC): Primary | ICD-10-CM

## 2019-09-03 DIAGNOSIS — E11.29 TYPE 2 DIABETES MELLITUS WITH ALBUMINURIA (HCC): Primary | ICD-10-CM

## 2019-09-03 DIAGNOSIS — E78.5 HYPERLIPIDEMIA, UNSPECIFIED HYPERLIPIDEMIA TYPE: ICD-10-CM

## 2019-09-03 PROCEDURE — 99214 OFFICE O/P EST MOD 30 MIN: CPT | Performed by: INTERNAL MEDICINE

## 2019-09-07 DIAGNOSIS — E11.9 TYPE 2 DIABETES MELLITUS WITHOUT COMPLICATION, WITHOUT LONG-TERM CURRENT USE OF INSULIN (HCC): ICD-10-CM

## 2019-09-09 RX ORDER — GLIMEPIRIDE 4 MG/1
TABLET ORAL
Qty: 180 TABLET | Refills: 0 | Status: SHIPPED | OUTPATIENT
Start: 2019-09-09 | End: 2019-12-14 | Stop reason: SDUPTHER

## 2019-11-20 DIAGNOSIS — I10 ESSENTIAL HYPERTENSION: ICD-10-CM

## 2019-11-20 DIAGNOSIS — E78.5 HYPERLIPIDEMIA, UNSPECIFIED HYPERLIPIDEMIA TYPE: ICD-10-CM

## 2019-11-20 DIAGNOSIS — E11.9 TYPE 2 DIABETES MELLITUS WITHOUT COMPLICATION, WITHOUT LONG-TERM CURRENT USE OF INSULIN (HCC): Primary | ICD-10-CM

## 2019-12-14 DIAGNOSIS — E11.9 TYPE 2 DIABETES MELLITUS WITHOUT COMPLICATION, WITHOUT LONG-TERM CURRENT USE OF INSULIN (HCC): ICD-10-CM

## 2019-12-16 RX ORDER — GLIMEPIRIDE 4 MG/1
TABLET ORAL
Qty: 180 TABLET | Refills: 0 | Status: SHIPPED | OUTPATIENT
Start: 2019-12-16 | End: 2020-03-18

## 2019-12-20 ENCOUNTER — RESULTS ENCOUNTER (OUTPATIENT)
Dept: ENDOCRINOLOGY | Age: 69
End: 2019-12-20

## 2019-12-20 DIAGNOSIS — I10 ESSENTIAL HYPERTENSION: ICD-10-CM

## 2019-12-20 DIAGNOSIS — E11.9 TYPE 2 DIABETES MELLITUS WITHOUT COMPLICATION, WITHOUT LONG-TERM CURRENT USE OF INSULIN (HCC): ICD-10-CM

## 2019-12-20 DIAGNOSIS — E78.5 HYPERLIPIDEMIA, UNSPECIFIED HYPERLIPIDEMIA TYPE: ICD-10-CM

## 2019-12-20 LAB
ALBUMIN SERPL-MCNC: 4.7 G/DL (ref 3.5–5.2)
ALBUMIN/GLOB SERPL: 1.8 G/DL
ALP SERPL-CCNC: 87 U/L (ref 39–117)
ALT SERPL-CCNC: 25 U/L (ref 1–41)
AST SERPL-CCNC: 28 U/L (ref 1–40)
BILIRUB SERPL-MCNC: 0.6 MG/DL (ref 0.2–1.2)
BUN SERPL-MCNC: 20 MG/DL (ref 8–23)
BUN/CREAT SERPL: 19 (ref 7–25)
CALCIUM SERPL-MCNC: 9.7 MG/DL (ref 8.6–10.5)
CHLORIDE SERPL-SCNC: 98 MMOL/L (ref 98–107)
CHOLEST SERPL-MCNC: 141 MG/DL (ref 0–200)
CO2 SERPL-SCNC: 25 MMOL/L (ref 22–29)
CREAT SERPL-MCNC: 1.05 MG/DL (ref 0.76–1.27)
GLOBULIN SER CALC-MCNC: 2.6 GM/DL
GLUCOSE SERPL-MCNC: 195 MG/DL (ref 65–99)
HBA1C MFR BLD: 8.9 % (ref 4.8–5.6)
HDLC SERPL-MCNC: 42 MG/DL (ref 40–60)
INTERPRETATION: NORMAL
LDLC SERPL CALC-MCNC: 51 MG/DL (ref 0–100)
Lab: NORMAL
POTASSIUM SERPL-SCNC: 5 MMOL/L (ref 3.5–5.2)
PROT SERPL-MCNC: 7.3 G/DL (ref 6–8.5)
SODIUM SERPL-SCNC: 137 MMOL/L (ref 136–145)
TRIGL SERPL-MCNC: 239 MG/DL (ref 0–150)
VLDLC SERPL CALC-MCNC: 47.8 MG/DL

## 2020-01-03 ENCOUNTER — OFFICE VISIT (OUTPATIENT)
Dept: ENDOCRINOLOGY | Age: 70
End: 2020-01-03

## 2020-01-03 VITALS
WEIGHT: 199.8 LBS | OXYGEN SATURATION: 95 % | SYSTOLIC BLOOD PRESSURE: 118 MMHG | BODY MASS INDEX: 29.59 KG/M2 | HEIGHT: 69 IN | HEART RATE: 64 BPM | DIASTOLIC BLOOD PRESSURE: 56 MMHG

## 2020-01-03 DIAGNOSIS — E78.5 HYPERLIPIDEMIA, UNSPECIFIED HYPERLIPIDEMIA TYPE: ICD-10-CM

## 2020-01-03 DIAGNOSIS — R80.9 TYPE 2 DIABETES MELLITUS WITH ALBUMINURIA (HCC): Primary | ICD-10-CM

## 2020-01-03 DIAGNOSIS — I25.10 CORONARY ARTERY DISEASE INVOLVING NATIVE CORONARY ARTERY OF NATIVE HEART WITHOUT ANGINA PECTORIS: ICD-10-CM

## 2020-01-03 DIAGNOSIS — I10 ESSENTIAL HYPERTENSION: ICD-10-CM

## 2020-01-03 DIAGNOSIS — E11.29 TYPE 2 DIABETES MELLITUS WITH ALBUMINURIA (HCC): Primary | ICD-10-CM

## 2020-01-03 PROCEDURE — 99214 OFFICE O/P EST MOD 30 MIN: CPT | Performed by: INTERNAL MEDICINE

## 2020-01-03 RX ORDER — ROSUVASTATIN CALCIUM 40 MG/1
TABLET, COATED ORAL
Qty: 30 TABLET | Refills: 6 | Status: SHIPPED | OUTPATIENT
Start: 2020-01-03 | End: 2020-09-08

## 2020-01-10 RX ORDER — PERPHENAZINE 16 MG/1
TABLET, FILM COATED ORAL
Qty: 200 EACH | Refills: 1 | Status: SHIPPED | OUTPATIENT
Start: 2020-01-10 | End: 2020-08-04

## 2020-03-18 DIAGNOSIS — E11.9 TYPE 2 DIABETES MELLITUS WITHOUT COMPLICATION, WITHOUT LONG-TERM CURRENT USE OF INSULIN (HCC): ICD-10-CM

## 2020-03-18 RX ORDER — GLIMEPIRIDE 4 MG/1
TABLET ORAL
Qty: 180 TABLET | Refills: 1 | Status: SHIPPED | OUTPATIENT
Start: 2020-03-18 | End: 2020-09-23

## 2020-04-23 DIAGNOSIS — E78.5 HYPERLIPIDEMIA, UNSPECIFIED HYPERLIPIDEMIA TYPE: ICD-10-CM

## 2020-04-23 DIAGNOSIS — E11.9 TYPE 2 DIABETES MELLITUS WITHOUT COMPLICATION, WITHOUT LONG-TERM CURRENT USE OF INSULIN (HCC): Primary | ICD-10-CM

## 2020-04-23 DIAGNOSIS — I10 ESSENTIAL HYPERTENSION: ICD-10-CM

## 2020-04-28 ENCOUNTER — RESULTS ENCOUNTER (OUTPATIENT)
Dept: ENDOCRINOLOGY | Age: 70
End: 2020-04-28

## 2020-04-28 DIAGNOSIS — I10 ESSENTIAL HYPERTENSION: ICD-10-CM

## 2020-04-28 DIAGNOSIS — E78.5 HYPERLIPIDEMIA, UNSPECIFIED HYPERLIPIDEMIA TYPE: ICD-10-CM

## 2020-04-28 DIAGNOSIS — E11.9 TYPE 2 DIABETES MELLITUS WITHOUT COMPLICATION, WITHOUT LONG-TERM CURRENT USE OF INSULIN (HCC): ICD-10-CM

## 2020-04-29 LAB
ALBUMIN SERPL-MCNC: 4.6 G/DL (ref 3.5–5.2)
ALBUMIN/CREAT UR: 117 MG/G CREAT (ref 0–29)
ALBUMIN/GLOB SERPL: 1.6 G/DL
ALP SERPL-CCNC: 86 U/L (ref 39–117)
ALT SERPL-CCNC: 30 U/L (ref 1–41)
AST SERPL-CCNC: 26 U/L (ref 1–40)
BILIRUB SERPL-MCNC: 0.7 MG/DL (ref 0.2–1.2)
BUN SERPL-MCNC: 24 MG/DL (ref 8–23)
BUN/CREAT SERPL: 25 (ref 7–25)
CALCIUM SERPL-MCNC: 10.3 MG/DL (ref 8.6–10.5)
CHLORIDE SERPL-SCNC: 97 MMOL/L (ref 98–107)
CHOLEST SERPL-MCNC: 151 MG/DL (ref 0–200)
CO2 SERPL-SCNC: 27.3 MMOL/L (ref 22–29)
CREAT SERPL-MCNC: 0.96 MG/DL (ref 0.76–1.27)
CREAT UR-MCNC: 123.4 MG/DL
GLOBULIN SER CALC-MCNC: 2.9 GM/DL
GLUCOSE SERPL-MCNC: 226 MG/DL (ref 65–99)
HBA1C MFR BLD: 8.14 % (ref 4.8–5.6)
HDLC SERPL-MCNC: 39 MG/DL (ref 40–60)
INTERPRETATION: NORMAL
LDLC SERPL CALC-MCNC: 32 MG/DL (ref 0–100)
Lab: NORMAL
MICROALBUMIN UR-MCNC: 144 UG/ML
POTASSIUM SERPL-SCNC: 4.7 MMOL/L (ref 3.5–5.2)
PROT SERPL-MCNC: 7.5 G/DL (ref 6–8.5)
SODIUM SERPL-SCNC: 140 MMOL/L (ref 136–145)
TRIGL SERPL-MCNC: 400 MG/DL (ref 0–150)
VLDLC SERPL CALC-MCNC: 80 MG/DL (ref 5–40)

## 2020-05-06 ENCOUNTER — OFFICE VISIT (OUTPATIENT)
Dept: ENDOCRINOLOGY | Age: 70
End: 2020-05-06

## 2020-05-06 DIAGNOSIS — E11.29 TYPE 2 DIABETES MELLITUS WITH ALBUMINURIA (HCC): Primary | ICD-10-CM

## 2020-05-06 DIAGNOSIS — I10 ESSENTIAL HYPERTENSION: ICD-10-CM

## 2020-05-06 DIAGNOSIS — I25.10 CORONARY ARTERY DISEASE INVOLVING NATIVE CORONARY ARTERY OF NATIVE HEART WITHOUT ANGINA PECTORIS: ICD-10-CM

## 2020-05-06 DIAGNOSIS — R80.9 TYPE 2 DIABETES MELLITUS WITH ALBUMINURIA (HCC): Primary | ICD-10-CM

## 2020-05-06 DIAGNOSIS — Z95.820 S/P ANGIOPLASTY WITH STENT: ICD-10-CM

## 2020-05-06 DIAGNOSIS — C61 PROSTATE CANCER (HCC): ICD-10-CM

## 2020-05-06 DIAGNOSIS — E78.5 HYPERLIPIDEMIA, UNSPECIFIED HYPERLIPIDEMIA TYPE: ICD-10-CM

## 2020-05-06 PROCEDURE — 99214 OFFICE O/P EST MOD 30 MIN: CPT | Performed by: INTERNAL MEDICINE

## 2020-05-06 RX ORDER — EZETIMIBE 10 MG/1
10 TABLET ORAL DAILY
COMMUNITY
Start: 2020-02-20 | End: 2020-09-09

## 2020-08-04 RX ORDER — PERPHENAZINE 16 MG/1
TABLET, FILM COATED ORAL
Qty: 200 EACH | Refills: 1 | Status: SHIPPED | OUTPATIENT
Start: 2020-08-04 | End: 2021-02-16

## 2020-08-27 DIAGNOSIS — I10 ESSENTIAL HYPERTENSION: ICD-10-CM

## 2020-08-27 DIAGNOSIS — E78.49 OTHER HYPERLIPIDEMIA: ICD-10-CM

## 2020-08-27 DIAGNOSIS — R80.9 TYPE 2 DIABETES MELLITUS WITH ALBUMINURIA (HCC): Primary | ICD-10-CM

## 2020-08-27 DIAGNOSIS — E11.29 TYPE 2 DIABETES MELLITUS WITH ALBUMINURIA (HCC): Primary | ICD-10-CM

## 2020-09-01 ENCOUNTER — RESULTS ENCOUNTER (OUTPATIENT)
Dept: ENDOCRINOLOGY | Age: 70
End: 2020-09-01

## 2020-09-01 DIAGNOSIS — R80.9 TYPE 2 DIABETES MELLITUS WITH ALBUMINURIA (HCC): ICD-10-CM

## 2020-09-01 DIAGNOSIS — E78.49 OTHER HYPERLIPIDEMIA: ICD-10-CM

## 2020-09-01 DIAGNOSIS — E11.29 TYPE 2 DIABETES MELLITUS WITH ALBUMINURIA (HCC): ICD-10-CM

## 2020-09-01 DIAGNOSIS — I10 ESSENTIAL HYPERTENSION: ICD-10-CM

## 2020-09-03 LAB
ALBUMIN SERPL-MCNC: 4.8 G/DL (ref 3.5–5.2)
ALBUMIN/GLOB SERPL: 2.3 G/DL
ALP SERPL-CCNC: 82 U/L (ref 39–117)
ALT SERPL-CCNC: 23 U/L (ref 1–41)
AST SERPL-CCNC: 25 U/L (ref 1–40)
BILIRUB SERPL-MCNC: 0.6 MG/DL (ref 0–1.2)
BUN SERPL-MCNC: 17 MG/DL (ref 8–23)
BUN/CREAT SERPL: 15.5 (ref 7–25)
CALCIUM SERPL-MCNC: 9.7 MG/DL (ref 8.6–10.5)
CHLORIDE SERPL-SCNC: 101 MMOL/L (ref 98–107)
CHOLEST SERPL-MCNC: 169 MG/DL (ref 0–200)
CO2 SERPL-SCNC: 27.2 MMOL/L (ref 22–29)
CREAT SERPL-MCNC: 1.1 MG/DL (ref 0.76–1.27)
GLOBULIN SER CALC-MCNC: 2.1 GM/DL
GLUCOSE SERPL-MCNC: 172 MG/DL (ref 65–99)
HBA1C MFR BLD: 7.1 % (ref 4.8–5.6)
HDLC SERPL-MCNC: 41 MG/DL (ref 40–60)
INTERPRETATION: NORMAL
LDLC SERPL CALC-MCNC: 67 MG/DL (ref 0–100)
Lab: NORMAL
POTASSIUM SERPL-SCNC: 3.7 MMOL/L (ref 3.5–5.2)
PROT SERPL-MCNC: 6.9 G/DL (ref 6–8.5)
SODIUM SERPL-SCNC: 141 MMOL/L (ref 136–145)
TRIGL SERPL-MCNC: 306 MG/DL (ref 0–150)
VLDLC SERPL CALC-MCNC: 61.2 MG/DL

## 2020-09-08 RX ORDER — ROSUVASTATIN CALCIUM 40 MG/1
TABLET, COATED ORAL
Qty: 30 TABLET | Refills: 5 | Status: SHIPPED | OUTPATIENT
Start: 2020-09-08 | End: 2021-03-22

## 2020-09-09 ENCOUNTER — OFFICE VISIT (OUTPATIENT)
Dept: ENDOCRINOLOGY | Age: 70
End: 2020-09-09

## 2020-09-09 VITALS
WEIGHT: 198.4 LBS | SYSTOLIC BLOOD PRESSURE: 132 MMHG | HEART RATE: 62 BPM | HEIGHT: 69 IN | DIASTOLIC BLOOD PRESSURE: 60 MMHG | BODY MASS INDEX: 29.38 KG/M2 | OXYGEN SATURATION: 98 %

## 2020-09-09 DIAGNOSIS — Z95.820 S/P ANGIOPLASTY WITH STENT: ICD-10-CM

## 2020-09-09 DIAGNOSIS — E78.5 HYPERLIPIDEMIA, UNSPECIFIED HYPERLIPIDEMIA TYPE: ICD-10-CM

## 2020-09-09 DIAGNOSIS — I10 ESSENTIAL HYPERTENSION: ICD-10-CM

## 2020-09-09 DIAGNOSIS — R80.9 TYPE 2 DIABETES MELLITUS WITH ALBUMINURIA (HCC): Primary | ICD-10-CM

## 2020-09-09 DIAGNOSIS — I25.10 CORONARY ARTERY DISEASE INVOLVING NATIVE CORONARY ARTERY OF NATIVE HEART WITHOUT ANGINA PECTORIS: ICD-10-CM

## 2020-09-09 DIAGNOSIS — E11.29 TYPE 2 DIABETES MELLITUS WITH ALBUMINURIA (HCC): Primary | ICD-10-CM

## 2020-09-09 PROCEDURE — 99214 OFFICE O/P EST MOD 30 MIN: CPT | Performed by: INTERNAL MEDICINE

## 2020-09-09 RX ORDER — ICOSAPENT ETHYL 1000 MG/1
1 CAPSULE ORAL 2 TIMES DAILY WITH MEALS
COMMUNITY
Start: 2020-07-08 | End: 2022-12-22 | Stop reason: SDUPTHER

## 2020-09-22 DIAGNOSIS — E11.9 TYPE 2 DIABETES MELLITUS WITHOUT COMPLICATION, WITHOUT LONG-TERM CURRENT USE OF INSULIN (HCC): ICD-10-CM

## 2020-09-23 RX ORDER — GLIMEPIRIDE 4 MG/1
TABLET ORAL
Qty: 180 TABLET | Refills: 1 | Status: SHIPPED | OUTPATIENT
Start: 2020-09-23 | End: 2021-03-24

## 2020-10-01 RX ORDER — DULAGLUTIDE 1.5 MG/.5ML
INJECTION, SOLUTION SUBCUTANEOUS
Qty: 4 PEN | Refills: 5 | Status: SHIPPED | OUTPATIENT
Start: 2020-10-01 | End: 2021-10-08

## 2020-12-18 DIAGNOSIS — E78.5 HYPERLIPIDEMIA, UNSPECIFIED HYPERLIPIDEMIA TYPE: ICD-10-CM

## 2020-12-18 DIAGNOSIS — E11.9 TYPE 2 DIABETES MELLITUS WITHOUT COMPLICATION, WITHOUT LONG-TERM CURRENT USE OF INSULIN (HCC): Primary | ICD-10-CM

## 2020-12-18 DIAGNOSIS — I10 ESSENTIAL HYPERTENSION: ICD-10-CM

## 2020-12-23 ENCOUNTER — RESULTS ENCOUNTER (OUTPATIENT)
Dept: ENDOCRINOLOGY | Age: 70
End: 2020-12-23

## 2020-12-23 DIAGNOSIS — E11.9 TYPE 2 DIABETES MELLITUS WITHOUT COMPLICATION, WITHOUT LONG-TERM CURRENT USE OF INSULIN (HCC): ICD-10-CM

## 2020-12-23 DIAGNOSIS — E78.5 HYPERLIPIDEMIA, UNSPECIFIED HYPERLIPIDEMIA TYPE: ICD-10-CM

## 2020-12-23 DIAGNOSIS — I10 ESSENTIAL HYPERTENSION: ICD-10-CM

## 2020-12-29 LAB
ALBUMIN SERPL-MCNC: 4.6 G/DL (ref 3.5–5.2)
ALBUMIN/GLOB SERPL: 1.6 G/DL
ALP SERPL-CCNC: 101 U/L (ref 39–117)
ALT SERPL-CCNC: 22 U/L (ref 1–41)
AST SERPL-CCNC: 23 U/L (ref 1–40)
BILIRUB SERPL-MCNC: 0.7 MG/DL (ref 0–1.2)
BUN SERPL-MCNC: 24 MG/DL (ref 8–23)
BUN/CREAT SERPL: 22.6 (ref 7–25)
CALCIUM SERPL-MCNC: 9.8 MG/DL (ref 8.6–10.5)
CHLORIDE SERPL-SCNC: 102 MMOL/L (ref 98–107)
CHOLEST SERPL-MCNC: 179 MG/DL (ref 0–200)
CO2 SERPL-SCNC: 27.9 MMOL/L (ref 22–29)
CREAT SERPL-MCNC: 1.06 MG/DL (ref 0.76–1.27)
GLOBULIN SER CALC-MCNC: 2.8 GM/DL
GLUCOSE SERPL-MCNC: 234 MG/DL (ref 65–99)
HBA1C MFR BLD: 8.2 % (ref 4.8–5.6)
HDLC SERPL-MCNC: 40 MG/DL (ref 40–60)
INTERPRETATION: NORMAL
LDLC SERPL CALC-MCNC: 72 MG/DL (ref 0–100)
Lab: NORMAL
POTASSIUM SERPL-SCNC: 3.5 MMOL/L (ref 3.5–5.2)
PROT SERPL-MCNC: 7.4 G/DL (ref 6–8.5)
SODIUM SERPL-SCNC: 142 MMOL/L (ref 136–145)
TRIGL SERPL-MCNC: 423 MG/DL (ref 0–150)
VLDLC SERPL CALC-MCNC: 67 MG/DL (ref 5–40)

## 2021-01-06 NOTE — PROGRESS NOTES
Subjective   Dennis Arnold is a 70 y.o. male.     F/u for dm 2, hyperlipidemia, CAD/ testing bs 2 x day / last dm eye exam nov 2020 with dr Ludwig/ last dm foot exam today with dr Lutz         Patient is a 70-year-old male who came in for follow-up.     Patient has known diabetes mellitus since 2010.  He has been on glimepiride 4 mg BID,  Trulicity 1.5 mg weekly, and  metformin 1000 mg twice a day.  He did not start on Jardiance, or Januvia because of a high co-pay.  He went into the donut hole in September 2020.  He stopped Invokana after 3 month because of a high co-pay.  FBS 140-200   Bedtime BS <200.  He denies any hypoglycemic episodes.  He is intermittently noncompliant with his diet.  He has gained 2 pounds since September 2020.  He walks daily.  Hemoglobin A1c done in  December 2020 is 8.2%.     His last eye examination was in 12/20 and there is no retinopathy.  He denies blurry vision.  He denies any numbness, tingling or burning in his hands or feet.   He has albuminuria on urine sample taken in 4/20.  He is on lisinopril.     He has known coronary artery disease and hypertension.  He had a previous three-vessel coronary artery bypass and angioplasty with stent.  He is on Coreg and lisinopril.  He denies chest pain or shortness of breath.  He follows with Dr. Sullivan.     He has hyperlipidemia and has been on Crestor 40 mg/day..  He denies any myalgia.  Lipid profile done in  December 2020 are as follows: Cholesterol 179.  HDL 40.  LDL 72.  Triglycerides 423     He was diagnosed to have prostate cancer.  He has completed external radiation therapy.  He has stopped androgen deprivation therapy.     He has never had a colonoscopy.  He denies bowel changes.  He denies melena or hematochezia.  He had an appointment with another physician for colonoscopy but was canceled because of COVID.     He chews tobacco.  He does not smoke cigarettes     The following portions of the patient's history were reviewed and  "updated as appropriate: allergies, current medications, past family history, past medical history, past social history, past surgical history and problem list.    Review of Systems   HENT: Negative.    Eyes: Negative.    Respiratory: Negative for shortness of breath.    Cardiovascular: Negative for chest pain and palpitations.   Gastrointestinal: Negative.    Endocrine: Negative for cold intolerance, heat intolerance and polyuria.   Genitourinary: Negative.    Musculoskeletal: Negative for arthralgias and myalgias.   Neurological: Negative for weakness and numbness.     Objective      Vitals:    01/13/21 0924   BP: 102/56   BP Location: Right arm   Patient Position: Sitting   Cuff Size: Large Adult   Pulse: 95   SpO2: 98%   Weight: 90.8 kg (200 lb 3.2 oz)   Height: 175.3 cm (69.02\")     Physical Exam  Constitutional:       Appearance: Normal appearance.   Eyes:      General: No scleral icterus.        Right eye: No discharge.         Left eye: No discharge.      Extraocular Movements: Extraocular movements intact.      Conjunctiva/sclera: Conjunctivae normal.   Neck:      Musculoskeletal: Neck supple. No neck rigidity or muscular tenderness.      Vascular: No carotid bruit.   Cardiovascular:      Rate and Rhythm: Normal rate and regular rhythm.      Heart sounds: Normal heart sounds. No murmur. No friction rub. No gallop.    Pulmonary:      Effort: No respiratory distress.      Breath sounds: Normal breath sounds. No stridor. No wheezing, rhonchi or rales.   Chest:      Chest wall: No tenderness.   Abdominal:      General: Bowel sounds are normal. There is no distension.      Palpations: Abdomen is soft.      Tenderness: There is no abdominal tenderness. There is no guarding or rebound.      Hernia: No hernia is present.   Musculoskeletal: Normal range of motion.         General: No swelling or tenderness.      Comments: No plantar ulcers   Lymphadenopathy:      Cervical: No cervical adenopathy.   Skin:     General: " Skin is warm and dry.      Coloration: Skin is not pale.   Neurological:      General: No focal deficit present.      Mental Status: He is alert and oriented to person, place, and time.      Comments: Intact light touch in lower extremities       Results Encounter on 12/23/2020   Component Date Value Ref Range Status   • Glucose 12/28/2020 234* 65 - 99 mg/dL Final   • BUN 12/28/2020 24* 8 - 23 mg/dL Final   • Creatinine 12/28/2020 1.06  0.76 - 1.27 mg/dL Final   • eGFR Non African Am 12/28/2020 69  >60 mL/min/1.73 Final    Comment: GFR Normal >60  Chronic Kidney Disease <60  Kidney Failure <15     • eGFR  Am 12/28/2020 84  >60 mL/min/1.73 Final   • BUN/Creatinine Ratio 12/28/2020 22.6  7.0 - 25.0 Final   • Sodium 12/28/2020 142  136 - 145 mmol/L Final   • Potassium 12/28/2020 3.5  3.5 - 5.2 mmol/L Final   • Chloride 12/28/2020 102  98 - 107 mmol/L Final   • Total CO2 12/28/2020 27.9  22.0 - 29.0 mmol/L Final   • Calcium 12/28/2020 9.8  8.6 - 10.5 mg/dL Final   • Total Protein 12/28/2020 7.4  6.0 - 8.5 g/dL Final   • Albumin 12/28/2020 4.60  3.50 - 5.20 g/dL Final   • Globulin 12/28/2020 2.8  gm/dL Final   • A/G Ratio 12/28/2020 1.6  g/dL Final   • Total Bilirubin 12/28/2020 0.7  0.0 - 1.2 mg/dL Final   • Alkaline Phosphatase 12/28/2020 101  39 - 117 U/L Final   • AST (SGOT) 12/28/2020 23  1 - 40 U/L Final   • ALT (SGPT) 12/28/2020 22  1 - 41 U/L Final   • Total Cholesterol 12/28/2020 179  0 - 200 mg/dL Final    Comment: Cholesterol Reference Ranges  (U.S. Department of Health and Human Services ATP III  Classifications)  Desirable          <200 mg/dL  Borderline High    200-239 mg/dL  High Risk          >240 mg/dL  Triglyceride Reference Ranges  (U.S. Department of Health and Human Services ATP III  Classifications)  Normal           <150 mg/dL  Borderline High  150-199 mg/dL  High             200-499 mg/dL  Very High        >500 mg/dL  HDL Reference Ranges  (U.S. Department of Health and Human Services ATP  III  Classifcations)  Low     <40 mg/dl (major risk factor for CHD)  High    >60 mg/dl ('negative' risk factor for CHD)  LDL Reference Ranges  (U.S. Department of Health and Human Services ATP III  Classifcations)  Optimal          <100 mg/dL  Near Optimal     100-129 mg/dL  Borderline High  130-159 mg/dL  High             160-189 mg/dL  Very High        >189 mg/dL     • Triglycerides 12/28/2020 423* 0 - 150 mg/dL Final   • HDL Cholesterol 12/28/2020 40  40 - 60 mg/dL Final   • VLDL Cholesterol [place] 12/28/2020 67* 5 - 40 mg/dL Final   • LDL Chol Calc (RUST) 12/28/2020 72  0 - 100 mg/dL Final   • Hemoglobin A1C 12/28/2020 8.20* 4.80 - 5.60 % Final    Comment: Hemoglobin A1C Ranges:  Increased Risk for Diabetes  5.7% to 6.4%  Diabetes                     >= 6.5%  Diabetic Goal                < 7.0%     • Interpretation 12/28/2020 Note   Final    Supplemental report is available.   • PDF Image 12/28/2020 Not applicable   Final     Assessment/Plan   Diagnoses and all orders for this visit:    1. Type 2 diabetes mellitus with albuminuria (CMS/[facility]) (Primary)    2. Coronary artery disease involving native coronary artery of native heart without angina pectoris    3. S/P angioplasty with stent    4. Hyperlipidemia, unspecified hyperlipidemia type    5. Essential hypertension    Other orders  -     pioglitazone (Actos) 15 MG tablet; Take 1 tablet by mouth Daily.  Dispense: 30 tablet; Refill: 6      Start Actos 15 mg/day.  Side effects and precautions discussed.  Continue glimepiride, Trulicity, and Metformin  Continue Coreg and lisinopril  Continue Crestor 40 mg/day    Copy of my note sent to Dr. Sullivan, Amy Johnson NP and Dr. Peace    Follow-up in 4 months

## 2021-01-13 ENCOUNTER — OFFICE VISIT (OUTPATIENT)
Dept: ENDOCRINOLOGY | Age: 71
End: 2021-01-13

## 2021-01-13 VITALS
SYSTOLIC BLOOD PRESSURE: 102 MMHG | OXYGEN SATURATION: 98 % | BODY MASS INDEX: 29.65 KG/M2 | HEART RATE: 95 BPM | WEIGHT: 200.2 LBS | DIASTOLIC BLOOD PRESSURE: 56 MMHG | HEIGHT: 69 IN

## 2021-01-13 DIAGNOSIS — I25.10 CORONARY ARTERY DISEASE INVOLVING NATIVE CORONARY ARTERY OF NATIVE HEART WITHOUT ANGINA PECTORIS: ICD-10-CM

## 2021-01-13 DIAGNOSIS — I10 ESSENTIAL HYPERTENSION: ICD-10-CM

## 2021-01-13 DIAGNOSIS — R80.9 TYPE 2 DIABETES MELLITUS WITH ALBUMINURIA (HCC): Primary | ICD-10-CM

## 2021-01-13 DIAGNOSIS — E11.29 TYPE 2 DIABETES MELLITUS WITH ALBUMINURIA (HCC): Primary | ICD-10-CM

## 2021-01-13 DIAGNOSIS — E78.5 HYPERLIPIDEMIA, UNSPECIFIED HYPERLIPIDEMIA TYPE: ICD-10-CM

## 2021-01-13 DIAGNOSIS — Z95.820 S/P ANGIOPLASTY WITH STENT: ICD-10-CM

## 2021-01-13 PROCEDURE — 99214 OFFICE O/P EST MOD 30 MIN: CPT | Performed by: INTERNAL MEDICINE

## 2021-01-13 RX ORDER — PIOGLITAZONEHYDROCHLORIDE 15 MG/1
15 TABLET ORAL DAILY
Qty: 30 TABLET | Refills: 6 | Status: SHIPPED | OUTPATIENT
Start: 2021-01-13 | End: 2021-07-13 | Stop reason: SDUPTHER

## 2021-02-16 RX ORDER — PERPHENAZINE 16 MG/1
TABLET, FILM COATED ORAL
Qty: 200 EACH | Refills: 1 | Status: SHIPPED | OUTPATIENT
Start: 2021-02-16 | End: 2021-09-10

## 2021-03-22 RX ORDER — ROSUVASTATIN CALCIUM 40 MG/1
TABLET, COATED ORAL
Qty: 30 TABLET | Refills: 5 | Status: SHIPPED | OUTPATIENT
Start: 2021-03-22 | End: 2021-09-27

## 2021-03-24 DIAGNOSIS — E11.9 TYPE 2 DIABETES MELLITUS WITHOUT COMPLICATION, WITHOUT LONG-TERM CURRENT USE OF INSULIN (HCC): ICD-10-CM

## 2021-03-24 RX ORDER — GLIMEPIRIDE 4 MG/1
TABLET ORAL
Qty: 180 TABLET | Refills: 1 | Status: SHIPPED | OUTPATIENT
Start: 2021-03-24 | End: 2021-09-27

## 2021-06-19 PROBLEM — E11.319 DIABETIC RETINOPATHY ASSOCIATED WITH TYPE 2 DIABETES MELLITUS (HCC): Status: ACTIVE | Noted: 2021-06-19

## 2021-07-01 DIAGNOSIS — I10 ESSENTIAL HYPERTENSION: ICD-10-CM

## 2021-07-01 DIAGNOSIS — E78.5 HYPERLIPIDEMIA, UNSPECIFIED HYPERLIPIDEMIA TYPE: ICD-10-CM

## 2021-07-01 DIAGNOSIS — E11.9 TYPE 2 DIABETES MELLITUS WITHOUT COMPLICATION, WITHOUT LONG-TERM CURRENT USE OF INSULIN (HCC): Primary | ICD-10-CM

## 2021-07-08 NOTE — PROGRESS NOTES
Subjective   Dennis Arnold is a 70 y.o. male.      F/u for dm 2, hyperlipidemia, CAD/ testing bs 2 x day / last dm eye exam 6/1/21 with dr Olson / last dm foot exam 1/13/21 with dr Lutz            Patient is a 70-year-old male who came in for follow-up.     Patient has known diabetes mellitus since 2010.  He has been on glimepiride 4 mg BID,  Trulicity 1.5 mg weekly, pioglitazone 15 mg/day, and  metformin 1000 mg twice a day.  He did not start on Jardiance, or Januvia because of a high co-pay.  He stopped Invokana after 3 month because of a high co-pay. -170   Bedtime BS 160-170.  He denies any hypoglycemic episodes.  He is intermittently noncompliant with his diet.  He has gained 3 pounds since January 2021.  Hemoglobin A1c done in July 2021 is 8.3%.    His last eye examination was in June 2021.  He has background retinopathy but did not require treatment..  He denies blurry vision.  He denies any numbness, tingling or burning in his hands or feet.   He has albuminuria on urine sample taken in July 2021..  He is on lisinopril.     He has known coronary artery disease and hypertension.  He had a previous three-vessel coronary artery bypass and angioplasty with stent.  He is on Coreg and lisinopril.  He denies chest pain or shortness of breath.  He follows with Dr. Sullivan.     He has hyperlipidemia and has been on Crestor 40 mg/day, Vascepa 1 g twice a day.  And Zetia 10 mg/day.  He denies any myalgia.  Lipid profile done in July 2021 as follows: Cholesterol 173.  HDL 45.  LDL 84.  Triglycerides 269.     He was diagnosed to have prostate cancer in 2019.  He has completed external radiation therapy.  He has stopped androgen deprivation therapy.  He follows with Dr. Azul.     He has never had a colonoscopy.  He denies bowel changes.  He denies melena or hematochezia.  He has not rescheduled his colonoscopy because of Covid.     He chews tobacco.  He does not smoke cigarettes     He had 2 Moderna Covid  "vaccines without major side effects.     The following portions of the patient's history were reviewed and updated as appropriate: allergies, current medications, past family history, past medical history, past social history, past surgical history and problem list.    Review of Systems   Constitutional: Negative.    HENT: Negative.    Respiratory: Negative for shortness of breath.    Cardiovascular: Negative for chest pain and palpitations.   Gastrointestinal: Negative.    Endocrine: Negative for cold intolerance and heat intolerance.   Genitourinary: Negative.    Musculoskeletal: Negative.  Negative for myalgias.   Neurological: Negative for weakness and numbness.   Hematological: Negative for adenopathy. Does not bruise/bleed easily.     Objective      Vitals:    07/13/21 1220   BP: 124/80   BP Location: Right arm   Patient Position: Sitting   Cuff Size: Large Adult   Pulse: 89   SpO2: 98%   Weight: 92.4 kg (203 lb 12.8 oz)   Height: 175.3 cm (69.02\")     Physical Exam  Constitutional:       Appearance: Normal appearance. He is obese. He is not ill-appearing.   HENT:      Head: Normocephalic.      Mouth/Throat:      Mouth: Mucous membranes are moist.   Eyes:      General: No scleral icterus.        Right eye: No discharge.         Left eye: No discharge.      Extraocular Movements: Extraocular movements intact.      Conjunctiva/sclera: Conjunctivae normal.   Neck:      Vascular: No carotid bruit.   Cardiovascular:      Rate and Rhythm: Normal rate and regular rhythm.      Pulses: Normal pulses.      Heart sounds: Normal heart sounds. No murmur heard.   No friction rub. No gallop.    Pulmonary:      Effort: Pulmonary effort is normal. No respiratory distress.      Breath sounds: Normal breath sounds. No stridor. No rales.   Chest:      Chest wall: No tenderness.   Abdominal:      General: Bowel sounds are normal.      Palpations: Abdomen is soft.   Musculoskeletal:         General: Normal range of motion.      " Cervical back: Normal range of motion and neck supple. No rigidity or tenderness.   Lymphadenopathy:      Cervical: No cervical adenopathy.   Neurological:      Mental Status: He is alert and oriented to person, place, and time.      Comments: Intact light touch   Psychiatric:         Mood and Affect: Mood normal.       Results Encounter on 12/23/2020   Component Date Value Ref Range Status   • Glucose 12/28/2020 234* 65 - 99 mg/dL Final   • BUN 12/28/2020 24* 8 - 23 mg/dL Final   • Creatinine 12/28/2020 1.06  0.76 - 1.27 mg/dL Final   • eGFR Non African Am 12/28/2020 69  >60 mL/min/1.73 Final    Comment: GFR Normal >60  Chronic Kidney Disease <60  Kidney Failure <15     • eGFR  Am 12/28/2020 84  >60 mL/min/1.73 Final   • BUN/Creatinine Ratio 12/28/2020 22.6  7.0 - 25.0 Final   • Sodium 12/28/2020 142  136 - 145 mmol/L Final   • Potassium 12/28/2020 3.5  3.5 - 5.2 mmol/L Final   • Chloride 12/28/2020 102  98 - 107 mmol/L Final   • Total CO2 12/28/2020 27.9  22.0 - 29.0 mmol/L Final   • Calcium 12/28/2020 9.8  8.6 - 10.5 mg/dL Final   • Total Protein 12/28/2020 7.4  6.0 - 8.5 g/dL Final   • Albumin 12/28/2020 4.60  3.50 - 5.20 g/dL Final   • Globulin 12/28/2020 2.8  gm/dL Final   • A/G Ratio 12/28/2020 1.6  g/dL Final   • Total Bilirubin 12/28/2020 0.7  0.0 - 1.2 mg/dL Final   • Alkaline Phosphatase 12/28/2020 101  39 - 117 U/L Final   • AST (SGOT) 12/28/2020 23  1 - 40 U/L Final   • ALT (SGPT) 12/28/2020 22  1 - 41 U/L Final   • Total Cholesterol 12/28/2020 179  0 - 200 mg/dL Final    Comment: Cholesterol Reference Ranges  (U.S. Department of Health and Human Services ATP III  Classifications)  Desirable          <200 mg/dL  Borderline High    200-239 mg/dL  High Risk          >240 mg/dL  Triglyceride Reference Ranges  (U.S. Department of Health and Human Services ATP III  Classifications)  Normal           <150 mg/dL  Borderline High  150-199 mg/dL  High             200-499 mg/dL  Very High        >500  mg/dL  HDL Reference Ranges  (U.S. Department of Health and Human Services ATP III  Classifcations)  Low     <40 mg/dl (major risk factor for CHD)  High    >60 mg/dl ('negative' risk factor for CHD)  LDL Reference Ranges  (U.S. Department of Health and Human Services ATP III  Classifcations)  Optimal          <100 mg/dL  Near Optimal     100-129 mg/dL  Borderline High  130-159 mg/dL  High             160-189 mg/dL  Very High        >189 mg/dL     • Triglycerides 12/28/2020 423* 0 - 150 mg/dL Final   • HDL Cholesterol 12/28/2020 40  40 - 60 mg/dL Final   • VLDL Cholesterol Edmond 12/28/2020 67* 5 - 40 mg/dL Final   • LDL Chol Calc ([facility]) 12/28/2020 72  0 - 100 mg/dL Final   • Hemoglobin A1C 12/28/2020 8.20* 4.80 - 5.60 % Final    Comment: Hemoglobin A1C Ranges:  Increased Risk for Diabetes  5.7% to 6.4%  Diabetes                     >= 6.5%  Diabetic Goal                < 7.0%     • Interpretation 12/28/2020 Note   Final    Supplemental report is available.   • PDF Image 12/28/2020 Not applicable   Final     Assessment/Plan   Diagnoses and all orders for this visit:    1. Type 2 diabetes mellitus with albuminuria (CMS/[facility]) (Primary)    2. Coronary artery disease involving native coronary artery of native heart without angina pectoris    3. S/P angioplasty with stent    4. Hyperlipidemia, unspecified hyperlipidemia type    5. Essential hypertension    Other orders  -     Blood Glucose Monitoring Suppl (Contour Next Monitor) w/Device kit; 1 kit 1 (One) Time As Needed (testing bs 2 x day) for up to 1 dose.  Dispense: 1 kit; Refill: 0  -     pioglitazone (Actos) 30 MG tablet; Take 1 tablet by mouth Daily.  Dispense: 90 tablet; Refill: 1      Increase Actos to 30 mg/day.  Continue glimepiride 4 mg twice daily and Metformin 1000 mg twice daily.  Continue Trulicity 1.5 mg weekly.  Continue Crestor, Vascepa, and Zetia per cardiologist.  Continue lisinopril 20 mg/day.    Copy of my note sent to Dr. Peace and Dr. Sullivan    [facility]  4 mos

## 2021-07-09 LAB
ALBUMIN SERPL-MCNC: 5 G/DL (ref 3.5–5.2)
ALBUMIN/CREAT UR: 578 MG/G CREAT (ref 0–29)
ALBUMIN/GLOB SERPL: 2.2 G/DL
ALP SERPL-CCNC: 88 U/L (ref 39–117)
ALT SERPL-CCNC: 21 U/L (ref 1–41)
AST SERPL-CCNC: 24 U/L (ref 1–40)
BILIRUB SERPL-MCNC: 0.8 MG/DL (ref 0–1.2)
BUN SERPL-MCNC: 19 MG/DL (ref 8–23)
BUN/CREAT SERPL: 18.6 (ref 7–25)
CALCIUM SERPL-MCNC: 9.8 MG/DL (ref 8.6–10.5)
CHLORIDE SERPL-SCNC: 98 MMOL/L (ref 98–107)
CHOLEST SERPL-MCNC: 173 MG/DL (ref 0–200)
CO2 SERPL-SCNC: 30.6 MMOL/L (ref 22–29)
CREAT SERPL-MCNC: 1.02 MG/DL (ref 0.76–1.27)
CREAT UR-MCNC: 157.2 MG/DL
GLOBULIN SER CALC-MCNC: 2.3 GM/DL
GLUCOSE SERPL-MCNC: 202 MG/DL (ref 65–99)
HBA1C MFR BLD: 8.3 % (ref 4.8–5.6)
HDLC SERPL-MCNC: 45 MG/DL (ref 40–60)
IMP & REVIEW OF LAB RESULTS: NORMAL
LDLC SERPL CALC-MCNC: 84 MG/DL (ref 0–100)
MICROALBUMIN UR-MCNC: 908.7 UG/ML
POTASSIUM SERPL-SCNC: 3.6 MMOL/L (ref 3.5–5.2)
PROT SERPL-MCNC: 7.3 G/DL (ref 6–8.5)
SODIUM SERPL-SCNC: 140 MMOL/L (ref 136–145)
TRIGL SERPL-MCNC: 269 MG/DL (ref 0–150)
VLDLC SERPL CALC-MCNC: 44 MG/DL (ref 5–40)

## 2021-07-13 ENCOUNTER — OFFICE VISIT (OUTPATIENT)
Dept: ENDOCRINOLOGY | Age: 71
End: 2021-07-13

## 2021-07-13 VITALS
DIASTOLIC BLOOD PRESSURE: 80 MMHG | OXYGEN SATURATION: 98 % | SYSTOLIC BLOOD PRESSURE: 124 MMHG | BODY MASS INDEX: 30.18 KG/M2 | HEART RATE: 89 BPM | HEIGHT: 69 IN | WEIGHT: 203.8 LBS

## 2021-07-13 DIAGNOSIS — Z95.820 S/P ANGIOPLASTY WITH STENT: ICD-10-CM

## 2021-07-13 DIAGNOSIS — I25.10 CORONARY ARTERY DISEASE INVOLVING NATIVE CORONARY ARTERY OF NATIVE HEART WITHOUT ANGINA PECTORIS: ICD-10-CM

## 2021-07-13 DIAGNOSIS — I10 ESSENTIAL HYPERTENSION: ICD-10-CM

## 2021-07-13 DIAGNOSIS — E78.5 HYPERLIPIDEMIA, UNSPECIFIED HYPERLIPIDEMIA TYPE: ICD-10-CM

## 2021-07-13 DIAGNOSIS — R80.9 TYPE 2 DIABETES MELLITUS WITH ALBUMINURIA (HCC): Primary | ICD-10-CM

## 2021-07-13 DIAGNOSIS — E11.29 TYPE 2 DIABETES MELLITUS WITH ALBUMINURIA (HCC): Primary | ICD-10-CM

## 2021-07-13 PROCEDURE — 99214 OFFICE O/P EST MOD 30 MIN: CPT | Performed by: INTERNAL MEDICINE

## 2021-07-13 RX ORDER — BLOOD-GLUCOSE METER
1 EACH MISCELLANEOUS ONCE AS NEEDED
Qty: 1 KIT | Refills: 0 | Status: SHIPPED | OUTPATIENT
Start: 2021-07-13

## 2021-07-13 RX ORDER — EZETIMIBE 10 MG/1
10 TABLET ORAL DAILY
COMMUNITY
End: 2022-12-22 | Stop reason: SDUPTHER

## 2021-07-13 RX ORDER — PIOGLITAZONEHYDROCHLORIDE 30 MG/1
30 TABLET ORAL DAILY
Qty: 90 TABLET | Refills: 1 | Status: SHIPPED | OUTPATIENT
Start: 2021-07-13 | End: 2022-02-11

## 2021-09-10 RX ORDER — PERPHENAZINE 16 MG/1
TABLET, FILM COATED ORAL
Qty: 200 EACH | Refills: 3 | Status: SHIPPED | OUTPATIENT
Start: 2021-09-10 | End: 2022-10-14

## 2021-09-17 ENCOUNTER — PRIOR AUTHORIZATION (OUTPATIENT)
Dept: ENDOCRINOLOGY | Age: 71
End: 2021-09-17

## 2021-09-17 NOTE — TELEPHONE ENCOUNTER
Drug is covered by current benefit plan. No further PA activity needed        9/17 PA started on Trulicity

## 2021-09-27 DIAGNOSIS — E11.9 TYPE 2 DIABETES MELLITUS WITHOUT COMPLICATION, WITHOUT LONG-TERM CURRENT USE OF INSULIN (HCC): ICD-10-CM

## 2021-09-27 RX ORDER — GLIMEPIRIDE 4 MG/1
TABLET ORAL
Qty: 180 TABLET | Refills: 1 | Status: SHIPPED | OUTPATIENT
Start: 2021-09-27 | End: 2022-03-28

## 2021-09-27 RX ORDER — ROSUVASTATIN CALCIUM 40 MG/1
TABLET, COATED ORAL
Qty: 90 TABLET | Refills: 1 | Status: SHIPPED | OUTPATIENT
Start: 2021-09-27 | End: 2022-03-28

## 2021-10-08 RX ORDER — DULAGLUTIDE 1.5 MG/.5ML
INJECTION, SOLUTION SUBCUTANEOUS
Qty: 2 ML | Refills: 3 | Status: SHIPPED | OUTPATIENT
Start: 2021-10-08 | End: 2022-01-14 | Stop reason: DRUGHIGH

## 2022-01-14 ENCOUNTER — OFFICE VISIT (OUTPATIENT)
Dept: ENDOCRINOLOGY | Age: 72
End: 2022-01-14

## 2022-01-14 VITALS
SYSTOLIC BLOOD PRESSURE: 171 MMHG | OXYGEN SATURATION: 97 % | DIASTOLIC BLOOD PRESSURE: 89 MMHG | RESPIRATION RATE: 20 BRPM | HEART RATE: 90 BPM | HEIGHT: 69 IN | WEIGHT: 207.2 LBS | BODY MASS INDEX: 30.69 KG/M2

## 2022-01-14 DIAGNOSIS — Z95.820 S/P ANGIOPLASTY WITH STENT: ICD-10-CM

## 2022-01-14 DIAGNOSIS — I10 ESSENTIAL HYPERTENSION: ICD-10-CM

## 2022-01-14 DIAGNOSIS — R80.9 TYPE 2 DIABETES MELLITUS WITH ALBUMINURIA: Primary | ICD-10-CM

## 2022-01-14 DIAGNOSIS — E11.29 TYPE 2 DIABETES MELLITUS WITH ALBUMINURIA: Primary | ICD-10-CM

## 2022-01-14 DIAGNOSIS — I25.10 CORONARY ARTERY DISEASE INVOLVING NATIVE CORONARY ARTERY OF NATIVE HEART WITHOUT ANGINA PECTORIS: ICD-10-CM

## 2022-01-14 DIAGNOSIS — E11.319 DIABETIC RETINOPATHY WITHOUT MACULAR EDEMA ASSOCIATED WITH TYPE 2 DIABETES MELLITUS, UNSPECIFIED LATERALITY, UNSPECIFIED RETINOPATHY SEVERITY: ICD-10-CM

## 2022-01-14 DIAGNOSIS — E78.5 HYPERLIPIDEMIA, UNSPECIFIED HYPERLIPIDEMIA TYPE: ICD-10-CM

## 2022-01-14 PROCEDURE — 99214 OFFICE O/P EST MOD 30 MIN: CPT | Performed by: INTERNAL MEDICINE

## 2022-01-14 RX ORDER — DULAGLUTIDE 3 MG/.5ML
3 INJECTION, SOLUTION SUBCUTANEOUS WEEKLY
Qty: 4 PEN | Refills: 6 | Status: SHIPPED | OUTPATIENT
Start: 2022-01-14 | End: 2022-03-17 | Stop reason: SDUPTHER

## 2022-01-14 NOTE — PROGRESS NOTES
Vannesa Arnold is a 71 y.o. male.     History of Present Illness     Patient is a 71-year-old male who came in for follow-up.     Patient has known diabetes mellitus since 2010.  He has been on glimepiride 4 mg BID,  Trulicity 1.5 mg weekly, pioglitazone 30 mg/day, and  metformin 1000 mg twice a day.  He did not start on Jardiance, or Januvia because of a high co-pay.  He stopped Invokana after 3 month because of a high co-pay. 's   Bedtime BS 160's.  His blood sugars were higher while he was taking prednisone for an allergic reaction.  He denies any hypoglycemic episodes.  He has gained 4 pounds since July 2021.  Hemoglobin A1c done in January 2021 is 7.7%     His last eye examination was in December 2021.  He has background retinopathy but did not require treatment..  He denies blurry vision.  He denies any numbness, tingling or burning in his hands or feet.   He has albuminuria on urine sample taken in July 2021..  He is on lisinopril.     He has known coronary artery disease and hypertension.  He had a previous three-vessel coronary artery bypass and angioplasty with stent.  He is on Coreg and lisinopril.  He denies chest pain or shortness of breath.  He follows with Dr. Sullivan.     He has hyperlipidemia and has been on Crestor 40 mg/day, Vascepa 1 g twice a day and Zetia 10 mg/day.  He denies any myalgia.  Lipid profile done in January 2021 are as follows: Cholesterol 171.  HDL 52.  LDL 85.  Triglycerides 204.     He was diagnosed to have prostate cancer in 2019.  He has completed external radiation therapy.  He has stopped androgen deprivation therapy.  He follows with Dr. Azul.     He has never had a colonoscopy.  He denies bowel changes.  He denies melena or hematochezia.  He has not rescheduled his colonoscopy because of Covid.     He chews tobacco.  He does not smoke cigarettes      He had 3 Moderna Covid vaccines without major side effects. He did not get flu vac.    The following  "portions of the patient's history were reviewed and updated as appropriate: allergies, current medications, past family history, past medical history, past social history, past surgical history and problem list.    Review of Systems   Constitutional: Negative.    HENT: Negative.    Eyes: Negative for visual disturbance.   Respiratory: Negative for shortness of breath and wheezing.    Cardiovascular: Negative for chest pain and palpitations.   Gastrointestinal: Negative.    Endocrine: Negative.  Negative for cold intolerance and heat intolerance.   Genitourinary: Negative.    Musculoskeletal: Negative.    Neurological: Negative for numbness.   Hematological: Negative for adenopathy. Does not bruise/bleed easily.   Psychiatric/Behavioral: Negative.      Objective      Vitals:    01/14/22 0823   BP: 171/89   Pulse: 90   Resp: 20   SpO2: 97%   Weight: 94 kg (207 lb 3.2 oz)   Height: 175.3 cm (69\")     Physical Exam  Constitutional:       General: He is not in acute distress.     Appearance: Normal appearance. He is obese. He is not ill-appearing, toxic-appearing or diaphoretic.   HENT:      Mouth/Throat:      Mouth: Mucous membranes are moist.      Pharynx: No oropharyngeal exudate or posterior oropharyngeal erythema.   Eyes:      General: No scleral icterus.        Right eye: No discharge.         Left eye: No discharge.      Extraocular Movements: Extraocular movements intact.      Conjunctiva/sclera: Conjunctivae normal.   Neck:      Vascular: No carotid bruit.   Cardiovascular:      Rate and Rhythm: Normal rate and regular rhythm.      Pulses: Normal pulses.      Heart sounds: Normal heart sounds. No murmur heard.  No friction rub. No gallop.    Pulmonary:      Effort: Pulmonary effort is normal. No respiratory distress.      Breath sounds: Normal breath sounds. No stridor. No rales.   Chest:      Chest wall: No tenderness.   Abdominal:      General: Bowel sounds are normal. There is no distension.      Palpations: " Abdomen is soft. There is no mass.      Tenderness: There is no right CVA tenderness or left CVA tenderness.   Musculoskeletal:         General: Normal range of motion.      Cervical back: Normal range of motion and neck supple. No rigidity or tenderness.   Lymphadenopathy:      Cervical: No cervical adenopathy.   Skin:     General: Skin is warm and dry.   Neurological:      General: No focal deficit present.      Mental Status: He is alert and oriented to person, place, and time.       Results Encounter on 07/12/2021   Component Date Value Ref Range Status   • Hemoglobin A1C 01/11/2022 7.7* 4.8 - 5.6 % Final    Comment:          Prediabetes: 5.7 - 6.4           Diabetes: >6.4           Glycemic control for adults with diabetes: <7.0     • Creatinine, Urine 01/11/2022 139.9  Not Estab. mg/dL Final   • Microalbumin, Urine 01/11/2022 939.9  Not Estab. ug/mL Final    Comment: Results confirmed on  dilution.     • Microalbumin/Creatinine Ratio 01/11/2022 672* 0 - 29 mg/g creat Final    Comment:                        Normal:                0 -  29                         Moderately increased: 30 - 300                         Severely increased:       >300     • Glucose 01/11/2022 267* 65 - 99 mg/dL Final   • BUN 01/11/2022 15  8 - 27 mg/dL Final   • Creatinine 01/11/2022 0.95  0.76 - 1.27 mg/dL Final   • eGFR Non  Am 01/11/2022 81  >59 mL/min/1.73 Final   • eGFR African Am 01/11/2022 93  >59 mL/min/1.73 Final    Comment: **In accordance with recommendations from the NKF-ASN Task force,**    [facility] is in the process of updating its eGFR calculation to the    2021 CKD-EPI creatinine equation that estimates kidney function    without a race variable.     • BUN/Creatinine Ratio 01/11/2022 16  10 - 24 Final   • Sodium 01/11/2022 140  134 - 144 mmol/L Final   • Potassium 01/11/2022 3.6  3.5 - 5.2 mmol/L Final   • Chloride 01/11/2022 97  96 - 106 mmol/L Final   • Total CO2 01/11/2022 28  20 - 29 mmol/L Final   •  Calcium 01/11/2022 9.5  8.6 - 10.2 mg/dL Final   • Total Protein 01/11/2022 7.3  6.0 - 8.5 g/dL Final   • Albumin 01/11/2022 4.5  3.8 - 4.8 g/dL Final   • Globulin 01/11/2022 2.8  1.5 - 4.5 g/dL Final   • A/G Ratio 01/11/2022 1.6  1.2 - 2.2 Final   • Total Bilirubin 01/11/2022 0.9  0.0 - 1.2 mg/dL Final   • Alkaline Phosphatase 01/11/2022 89  44 - 121 IU/L Final                  **Please note reference interval change**   • AST (SGOT) 01/11/2022 22  0 - 40 IU/L Final   • ALT (SGPT) 01/11/2022 18  0 - 44 IU/L Final   • Total Cholesterol 01/11/2022 171  100 - 199 mg/dL Final   • Triglycerides 01/11/2022 204* 0 - 149 mg/dL Final   • HDL Cholesterol 01/11/2022 52  >39 mg/dL Final   • VLDL Cholesterol Edmond 01/11/2022 34  5 - 40 mg/dL Final   • LDL Chol Calc ([facility]) 01/11/2022 85  0 - 99 mg/dL Final   • Interpretation 01/11/2022 Note   Final    Supplemental report is available.     Assessment/Plan   Diagnoses and all orders for this visit:    1. 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 Coronary artery disease involving native coronary artery of native heart without angina pectoris    3. S/P angioplasty with stent    4. Hyperlipidemia, unspecified hyperlipidemia type    5. Essential hypertension    6. Diabetic retinopathy without macular edema associated with type 2 diabetes mellitus, unspecified laterality, unspecified retinopathy severity (HCC)    Other orders  -     Dulaglutide (Trulicity) 3 MG/0.5ML solution pen-injector; Inject 0.5 mL under the skin into the appropriate area as directed 1 (One) Time Per Week.  Dispense: 4 pen; Refill: 6      Increase Trulicity to 3 mg weekly.  Continue glimepiride, pioglitazone and metformin.  Continue lisinopril and Coreg.  Will defer blood pressure control to Dr. Sullivan.  Continue Crestor 40 mg/day, Zetia 10 mg a day and Vascepa.  Advised to schedule colonoscopy.  Follow-up with ophthalmology as scheduled.    Copy of my note sent to Dr. Peace, Dr. Sullivan, and  Ryder Ludwig, OD    RTC 4 mos.

## 2022-02-11 RX ORDER — PIOGLITAZONEHYDROCHLORIDE 30 MG/1
TABLET ORAL
Qty: 90 TABLET | Refills: 1 | Status: SHIPPED | OUTPATIENT
Start: 2022-02-11 | End: 2022-06-16

## 2022-03-17 ENCOUNTER — TELEPHONE (OUTPATIENT)
Dept: ENDOCRINOLOGY | Age: 72
End: 2022-03-17

## 2022-03-17 RX ORDER — DULAGLUTIDE 3 MG/.5ML
3 INJECTION, SOLUTION SUBCUTANEOUS WEEKLY
Qty: 12 PEN | Refills: 1 | Status: SHIPPED | OUTPATIENT
Start: 2022-03-17 | End: 2022-04-13 | Stop reason: SDUPTHER

## 2022-03-25 DIAGNOSIS — E11.9 TYPE 2 DIABETES MELLITUS WITHOUT COMPLICATION, WITHOUT LONG-TERM CURRENT USE OF INSULIN: ICD-10-CM

## 2022-03-28 RX ORDER — ROSUVASTATIN CALCIUM 40 MG/1
TABLET, COATED ORAL
Qty: 90 TABLET | Refills: 1 | Status: SHIPPED | OUTPATIENT
Start: 2022-03-28 | End: 2022-10-03

## 2022-03-28 RX ORDER — GLIMEPIRIDE 4 MG/1
TABLET ORAL
Qty: 180 TABLET | Refills: 1 | Status: SHIPPED | OUTPATIENT
Start: 2022-03-28 | End: 2022-10-03

## 2022-04-13 ENCOUNTER — TELEPHONE (OUTPATIENT)
Dept: ENDOCRINOLOGY | Age: 72
End: 2022-04-13

## 2022-04-13 RX ORDER — DULAGLUTIDE 3 MG/.5ML
3 INJECTION, SOLUTION SUBCUTANEOUS WEEKLY
Qty: 12 PEN | Refills: 1 | Status: SHIPPED | OUTPATIENT
Start: 2022-04-13 | End: 2022-06-23 | Stop reason: SDUPTHER

## 2022-04-15 ENCOUNTER — TELEPHONE (OUTPATIENT)
Dept: ENDOCRINOLOGY | Age: 72
End: 2022-04-15

## 2022-05-09 DIAGNOSIS — I25.10 CORONARY ARTERY DISEASE INVOLVING NATIVE CORONARY ARTERY OF NATIVE HEART WITHOUT ANGINA PECTORIS: ICD-10-CM

## 2022-05-09 DIAGNOSIS — R80.9 TYPE 2 DIABETES MELLITUS WITH ALBUMINURIA: Primary | ICD-10-CM

## 2022-05-09 DIAGNOSIS — E78.5 HYPERLIPIDEMIA, UNSPECIFIED HYPERLIPIDEMIA TYPE: ICD-10-CM

## 2022-05-09 DIAGNOSIS — E11.29 TYPE 2 DIABETES MELLITUS WITH ALBUMINURIA: Primary | ICD-10-CM

## 2022-06-02 ENCOUNTER — LAB (OUTPATIENT)
Dept: ENDOCRINOLOGY | Age: 72
End: 2022-06-02

## 2022-06-02 DIAGNOSIS — R80.9 TYPE 2 DIABETES MELLITUS WITH ALBUMINURIA: ICD-10-CM

## 2022-06-02 DIAGNOSIS — E11.29 TYPE 2 DIABETES MELLITUS WITH ALBUMINURIA: ICD-10-CM

## 2022-06-02 DIAGNOSIS — E78.5 HYPERLIPIDEMIA, UNSPECIFIED HYPERLIPIDEMIA TYPE: ICD-10-CM

## 2022-06-02 DIAGNOSIS — I25.10 CORONARY ARTERY DISEASE INVOLVING NATIVE CORONARY ARTERY OF NATIVE HEART WITHOUT ANGINA PECTORIS: ICD-10-CM

## 2022-06-03 LAB
ALBUMIN SERPL-MCNC: 4.6 G/DL (ref 3.8–4.8)
ALBUMIN/GLOB SERPL: 1.7 {RATIO} (ref 1.2–2.2)
ALP SERPL-CCNC: 82 IU/L (ref 44–121)
ALT SERPL-CCNC: 19 IU/L (ref 0–44)
AST SERPL-CCNC: 25 IU/L (ref 0–40)
BILIRUB SERPL-MCNC: 0.9 MG/DL (ref 0–1.2)
BUN SERPL-MCNC: 20 MG/DL (ref 8–27)
BUN/CREAT SERPL: 17 (ref 10–24)
CALCIUM SERPL-MCNC: 10.1 MG/DL (ref 8.6–10.2)
CHLORIDE SERPL-SCNC: 98 MMOL/L (ref 96–106)
CHOLEST SERPL-MCNC: 172 MG/DL (ref 100–199)
CO2 SERPL-SCNC: 29 MMOL/L (ref 20–29)
CREAT SERPL-MCNC: 1.18 MG/DL (ref 0.76–1.27)
EGFRCR SERPLBLD CKD-EPI 2021: 66 ML/MIN/1.73
GLOBULIN SER CALC-MCNC: 2.7 G/DL (ref 1.5–4.5)
GLUCOSE SERPL-MCNC: 160 MG/DL (ref 65–99)
HBA1C MFR BLD: 7.1 % (ref 4.8–5.6)
HDLC SERPL-MCNC: 46 MG/DL
IMP & REVIEW OF LAB RESULTS: NORMAL
LDLC SERPL CALC-MCNC: 92 MG/DL (ref 0–99)
POTASSIUM SERPL-SCNC: 2.8 MMOL/L (ref 3.5–5.2)
PROT SERPL-MCNC: 7.3 G/DL (ref 6–8.5)
SODIUM SERPL-SCNC: 144 MMOL/L (ref 134–144)
TRIGL SERPL-MCNC: 198 MG/DL (ref 0–149)
TSH SERPL DL<=0.005 MIU/L-ACNC: 1.85 UIU/ML (ref 0.45–4.5)
VIT B12 SERPL-MCNC: 249 PG/ML (ref 232–1245)
VLDLC SERPL CALC-MCNC: 34 MG/DL (ref 5–40)

## 2022-06-15 NOTE — PROGRESS NOTES
Subjective   Dennis Arnold is a 71 y.o. male.      F/u for dm 2, hyperlipidemia, CAD/ testing bs 2 x day / last dm eye exam 12/27/21 with dr Olson / last dm foot exam 1/10/22 with dr Lutz         Patient is a 71-year-old male who came in for follow-up.     Patient has known diabetes mellitus since 2010.  He has been on glimepiride 4 mg BID,  Trulicity 3 mg weekly, pioglitazone 30 mg/day, and  metformin 1000 mg twice a day.  He gets nauseated and tired shortly after taking Trulicity.  He did not start on Jardiance, or Januvia because of a high co-pay.  He stopped Invokana after 3 month because of a high co-pay. -140   Bedtime BS 140-175.  His blood sugars were higher while he was taking prednisone for an allergic reaction.  He denies any hypoglycemic episodes.  He has lost 5 pounds since January 2022.  Hemoglobin A1c done in June 2022 is 7.1%.     His last eye examination was in December 2021.  He has background retinopathy but did not require treatment..  He denies blurry vision.  He denies any numbness, tingling or burning in his hands or feet.   He has albuminuria on urine sample taken in July 2021..  He is on lisinopril.     He has known coronary artery disease and hypertension.  He had a previous three-vessel coronary artery bypass and angioplasty with stent.  He is on Coreg and lisinopril.  He denies chest pain or shortness of breath.  He follows with Dr. Sullivan.     He has hyperlipidemia and has been on Crestor 40 mg/day, Vascepa 1 g twice a day and Zetia 10 mg/day.  He denies any myalgia.  Lipid profile done in June 2022 are as follows: Cholesterol 172.  HDL 46.  LDL 92.  Triglycerides 198.     He was diagnosed to have prostate cancer in 2019.  He has completed external radiation therapy.  He has stopped androgen deprivation therapy.  He follows with Dr. Azul.     He has never had a colonoscopy.  He denies bowel changes.  He denies melena or hematochezia.  He has not rescheduled his colonoscopy  "because of Covid.     He chews tobacco.  He does not smoke cigarettes      He had 3 Moderna Covid vaccines without major side effects.      The following portions of the patient's history were reviewed and updated as appropriate: allergies, current medications, past family history, past medical history, past social history, past surgical history and problem list.    Review of Systems   Eyes: Negative for visual disturbance.   Respiratory: Negative for shortness of breath.    Cardiovascular: Negative for chest pain and palpitations.   Gastrointestinal: Negative.    Endocrine: Negative for cold intolerance and heat intolerance.   Genitourinary: Negative.    Musculoskeletal: Negative for myalgias.   Neurological: Negative for numbness.     Vitals:    06/16/22 0840   BP: 120/50   Pulse: 94   Temp: 97.7 °F (36.5 °C)   SpO2: 97%   Weight: 91.9 kg (202 lb 9.6 oz)   Height: 175.3 cm (69\")      Objective   Physical Exam  Constitutional:       Appearance: Normal appearance. He is not toxic-appearing or diaphoretic.   Eyes:      General: No scleral icterus.        Right eye: No discharge.         Left eye: No discharge.   Neck:      Vascular: No carotid bruit.   Cardiovascular:      Rate and Rhythm: Normal rate and regular rhythm.      Heart sounds: Normal heart sounds. No murmur heard.    No friction rub. No gallop.   Pulmonary:      Effort: No respiratory distress.      Breath sounds: Normal breath sounds. No stridor. No rales.   Chest:      Chest wall: No tenderness.   Abdominal:      General: Bowel sounds are normal.      Palpations: Abdomen is soft.      Tenderness: There is no right CVA tenderness or left CVA tenderness.   Lymphadenopathy:      Cervical: No cervical adenopathy.   Skin:     General: Skin is warm and dry.   Neurological:      General: No focal deficit present.      Mental Status: He is alert and oriented to person, place, and time.   Psychiatric:         Mood and Affect: Mood normal.         Behavior: " Behavior normal.       Lab on 06/02/2022   Component Date Value Ref Range Status   • Vitamin B-12 06/02/2022 249  232 - 1,245 pg/mL Final   • TSH 06/02/2022 1.850  0.450 - 4.500 uIU/mL Final   • Hemoglobin A1C 06/02/2022 7.1 (A) 4.8 - 5.6 % Final    Comment:          Prediabetes: 5.7 - 6.4           Diabetes: >6.4           Glycemic control for adults with diabetes: <7.0     • Total Cholesterol 06/02/2022 172  100 - 199 mg/dL Final   • Triglycerides 06/02/2022 198 (A) 0 - 149 mg/dL Final   • HDL Cholesterol 06/02/2022 46  >39 mg/dL Final   • VLDL Cholesterol Edmond 06/02/2022 34  5 - 40 mg/dL Final   • LDL Chol Calc ([facility]) 06/02/2022 92  0 - 99 mg/dL Final   • Glucose 06/02/2022 160 (A) 65 - 99 mg/dL Final   • BUN 06/02/2022 20  8 - 27 mg/dL Final   • Creatinine 06/02/2022 1.18  0.76 - 1.27 mg/dL Final   • EGFR Result 06/02/2022 66  >59 mL/min/1.73 Final   • BUN/Creatinine Ratio 06/02/2022 17  10 - 24 Final   • Sodium 06/02/2022 144  134 - 144 mmol/L Final   • Potassium 06/02/2022 2.8 (A) 3.5 - 5.2 mmol/L Final   • Chloride 06/02/2022 98  96 - 106 mmol/L Final   • Total CO2 06/02/2022 29  20 - 29 mmol/L Final   • Calcium 06/02/2022 10.1  8.6 - 10.2 mg/dL Final   • Total Protein 06/02/2022 7.3  6.0 - 8.5 g/dL Final   • Albumin 06/02/2022 4.6  3.8 - 4.8 g/dL Final   • Globulin 06/02/2022 2.7  1.5 - 4.5 g/dL Final   • A/G Ratio 06/02/2022 1.7  1.2 - 2.2 Final   • Total Bilirubin 06/02/2022 0.9  0.0 - 1.2 mg/dL Final   • Alkaline Phosphatase 06/02/2022 82  44 - 121 IU/L Final   • AST (SGOT) 06/02/2022 25  0 - 40 IU/L Final   • ALT (SGPT) 06/02/2022 19  0 - 44 IU/L Final   • Interpretation 06/02/2022 Note   Final    Supplemental report is available.     Assessment & Plan   Diagnoses and all orders for this visit:    1. 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 Coronary artery disease involving native coronary artery of native heart without angina pectoris    3. S/P angioplasty with stent    4.  Hyperlipidemia, unspecified hyperlipidemia type    5. Essential hypertension      Continue Trulicity 3 mg weekly, glimepiride 4 mg twice daily, pioglitazone 30 mg daily, and metformin 1000 mg twice daily.  Continue Coreg and lisinopril.  Continue Crestor 40 mg/day, Vascepa 1 g twice a day, Zetia 10 mg/day and low-fat diet.  Advised to schedule colonoscopy.    Copy of my note sent to Dr. Peace.    RTC 4 mos.

## 2022-06-16 ENCOUNTER — OFFICE VISIT (OUTPATIENT)
Dept: ENDOCRINOLOGY | Age: 72
End: 2022-06-16

## 2022-06-16 VITALS
BODY MASS INDEX: 30.01 KG/M2 | SYSTOLIC BLOOD PRESSURE: 120 MMHG | HEART RATE: 94 BPM | TEMPERATURE: 97.7 F | DIASTOLIC BLOOD PRESSURE: 50 MMHG | OXYGEN SATURATION: 97 % | HEIGHT: 69 IN | WEIGHT: 202.6 LBS

## 2022-06-16 DIAGNOSIS — I10 ESSENTIAL HYPERTENSION: ICD-10-CM

## 2022-06-16 DIAGNOSIS — E78.5 HYPERLIPIDEMIA, UNSPECIFIED HYPERLIPIDEMIA TYPE: ICD-10-CM

## 2022-06-16 DIAGNOSIS — Z95.820 S/P ANGIOPLASTY WITH STENT: ICD-10-CM

## 2022-06-16 DIAGNOSIS — R80.9 TYPE 2 DIABETES MELLITUS WITH ALBUMINURIA: Primary | ICD-10-CM

## 2022-06-16 DIAGNOSIS — I25.10 CORONARY ARTERY DISEASE INVOLVING NATIVE CORONARY ARTERY OF NATIVE HEART WITHOUT ANGINA PECTORIS: ICD-10-CM

## 2022-06-16 DIAGNOSIS — E11.29 TYPE 2 DIABETES MELLITUS WITH ALBUMINURIA: Primary | ICD-10-CM

## 2022-06-16 PROCEDURE — 99214 OFFICE O/P EST MOD 30 MIN: CPT | Performed by: INTERNAL MEDICINE

## 2022-06-23 ENCOUNTER — TELEPHONE (OUTPATIENT)
Dept: ENDOCRINOLOGY | Age: 72
End: 2022-06-23

## 2022-06-23 RX ORDER — DULAGLUTIDE 3 MG/.5ML
3 INJECTION, SOLUTION SUBCUTANEOUS WEEKLY
Qty: 6 ML | Refills: 1 | Status: SHIPPED | OUTPATIENT
Start: 2022-06-23 | End: 2022-09-08 | Stop reason: SDUPTHER

## 2022-08-05 ENCOUNTER — TELEPHONE (OUTPATIENT)
Dept: ENDOCRINOLOGY | Age: 72
End: 2022-08-05

## 2022-08-15 RX ORDER — PIOGLITAZONEHYDROCHLORIDE 30 MG/1
TABLET ORAL
Qty: 90 TABLET | Refills: 0 | Status: SHIPPED | OUTPATIENT
Start: 2022-08-15 | End: 2022-12-22

## 2022-09-08 RX ORDER — DULAGLUTIDE 3 MG/.5ML
3 INJECTION, SOLUTION SUBCUTANEOUS WEEKLY
Qty: 6 ML | Refills: 1 | Status: SHIPPED | OUTPATIENT
Start: 2022-09-08 | End: 2022-12-07

## 2022-10-02 DIAGNOSIS — E11.9 TYPE 2 DIABETES MELLITUS WITHOUT COMPLICATION, WITHOUT LONG-TERM CURRENT USE OF INSULIN: ICD-10-CM

## 2022-10-03 RX ORDER — GLIMEPIRIDE 4 MG/1
TABLET ORAL
Qty: 180 TABLET | Refills: 1 | Status: SHIPPED | OUTPATIENT
Start: 2022-10-03 | End: 2022-12-22 | Stop reason: SDUPTHER

## 2022-10-03 RX ORDER — ROSUVASTATIN CALCIUM 40 MG/1
TABLET, COATED ORAL
Qty: 90 TABLET | Refills: 1 | Status: SHIPPED | OUTPATIENT
Start: 2022-10-03 | End: 2022-12-22 | Stop reason: SDUPTHER

## 2022-10-14 RX ORDER — PERPHENAZINE 16 MG/1
TABLET, FILM COATED ORAL
Qty: 200 EACH | Refills: 3 | Status: SHIPPED | OUTPATIENT
Start: 2022-10-14

## 2022-12-07 ENCOUNTER — TELEPHONE (OUTPATIENT)
Dept: ENDOCRINOLOGY | Age: 72
End: 2022-12-07

## 2022-12-07 NOTE — TELEPHONE ENCOUNTER
PT CALLED WANTING A REFILL OF THE BELOW MED.    metFORMIN (GLUCOPHAGE) 1000 MG tablet [50480]    [facility] [id number] - [place], [place] - [address] [place], [place] [address]Y - [phone]  - [phone] FX

## 2022-12-08 ENCOUNTER — TELEPHONE (OUTPATIENT)
Dept: ENDOCRINOLOGY | Age: 72
End: 2022-12-08

## 2022-12-09 NOTE — TELEPHONE ENCOUNTER
Patients wife called in regarding needing a medcation refilled on metformin. The patient hasnt been on it for a week now.

## 2022-12-11 DIAGNOSIS — R80.9 TYPE 2 DIABETES MELLITUS WITH ALBUMINURIA: Primary | ICD-10-CM

## 2022-12-11 DIAGNOSIS — E11.29 TYPE 2 DIABETES MELLITUS WITH ALBUMINURIA: Primary | ICD-10-CM

## 2022-12-11 DIAGNOSIS — E78.5 HYPERLIPIDEMIA, UNSPECIFIED HYPERLIPIDEMIA TYPE: ICD-10-CM

## 2022-12-15 DIAGNOSIS — R80.9 TYPE 2 DIABETES MELLITUS WITH ALBUMINURIA: ICD-10-CM

## 2022-12-15 DIAGNOSIS — E11.29 TYPE 2 DIABETES MELLITUS WITH ALBUMINURIA: ICD-10-CM

## 2022-12-15 DIAGNOSIS — E78.5 HYPERLIPIDEMIA, UNSPECIFIED HYPERLIPIDEMIA TYPE: ICD-10-CM

## 2022-12-15 LAB
ALBUMIN SERPL-MCNC: 4.7 G/DL (ref 3.5–5.2)
ALBUMIN/GLOB SERPL: 2.1 G/DL
ALP SERPL-CCNC: 111 U/L (ref 39–117)
ALT SERPL-CCNC: 32 U/L (ref 1–41)
AST SERPL-CCNC: 25 U/L (ref 1–40)
BILIRUB SERPL-MCNC: 0.8 MG/DL (ref 0–1.2)
BUN SERPL-MCNC: 30 MG/DL (ref 8–23)
BUN/CREAT SERPL: 25 (ref 7–25)
CALCIUM SERPL-MCNC: 11 MG/DL (ref 8.6–10.5)
CHLORIDE SERPL-SCNC: 100 MMOL/L (ref 98–107)
CHOLEST SERPL-MCNC: 193 MG/DL (ref 0–200)
CO2 SERPL-SCNC: 26.4 MMOL/L (ref 22–29)
CREAT SERPL-MCNC: 1.2 MG/DL (ref 0.76–1.27)
CREAT UR-MCNC: 129.5 MG/DL
EGFRCR SERPLBLD CKD-EPI 2021: 64.7 ML/MIN/1.73
GLOBULIN SER CALC-MCNC: 2.2 GM/DL
GLUCOSE SERPL-MCNC: 191 MG/DL (ref 65–99)
HBA1C MFR BLD: 8.3 % (ref 4.8–5.6)
HDLC SERPL-MCNC: 42 MG/DL (ref 40–60)
IMP & REVIEW OF LAB RESULTS: NORMAL
LDLC SERPL CALC-MCNC: 93 MG/DL (ref 0–100)
POTASSIUM SERPL-SCNC: 5.4 MMOL/L (ref 3.5–5.2)
PROT SERPL-MCNC: 6.9 G/DL (ref 6–8.5)
PROT UR-MCNC: 41.8 MG/DL
PROT/CREAT UR: 322.8 MG/G CREA (ref 0–200)
SODIUM SERPL-SCNC: 136 MMOL/L (ref 136–145)
TRIGL SERPL-MCNC: 350 MG/DL (ref 0–150)
TSH SERPL DL<=0.005 MIU/L-ACNC: 3.58 UIU/ML (ref 0.27–4.2)
VIT B12 SERPL-MCNC: 525 PG/ML (ref 211–946)
VLDLC SERPL CALC-MCNC: 58 MG/DL (ref 5–40)

## 2022-12-22 ENCOUNTER — OFFICE VISIT (OUTPATIENT)
Dept: ENDOCRINOLOGY | Age: 72
End: 2022-12-22

## 2022-12-22 VITALS
HEART RATE: 88 BPM | OXYGEN SATURATION: 96 % | WEIGHT: 200.4 LBS | TEMPERATURE: 97.3 F | BODY MASS INDEX: 29.68 KG/M2 | SYSTOLIC BLOOD PRESSURE: 130 MMHG | DIASTOLIC BLOOD PRESSURE: 70 MMHG | HEIGHT: 69 IN

## 2022-12-22 DIAGNOSIS — R80.9 TYPE 2 DIABETES MELLITUS WITH ALBUMINURIA: Primary | ICD-10-CM

## 2022-12-22 DIAGNOSIS — E11.9 TYPE 2 DIABETES MELLITUS WITHOUT COMPLICATION, WITHOUT LONG-TERM CURRENT USE OF INSULIN: ICD-10-CM

## 2022-12-22 DIAGNOSIS — Z95.820 S/P ANGIOPLASTY WITH STENT: ICD-10-CM

## 2022-12-22 DIAGNOSIS — C61 PROSTATE CANCER: ICD-10-CM

## 2022-12-22 DIAGNOSIS — E11.29 TYPE 2 DIABETES MELLITUS WITH ALBUMINURIA: Primary | ICD-10-CM

## 2022-12-22 DIAGNOSIS — I25.10 CORONARY ARTERY DISEASE INVOLVING NATIVE CORONARY ARTERY OF NATIVE HEART WITHOUT ANGINA PECTORIS: ICD-10-CM

## 2022-12-22 DIAGNOSIS — E78.5 HYPERLIPIDEMIA, UNSPECIFIED HYPERLIPIDEMIA TYPE: ICD-10-CM

## 2022-12-22 PROCEDURE — 99214 OFFICE O/P EST MOD 30 MIN: CPT | Performed by: INTERNAL MEDICINE

## 2022-12-22 RX ORDER — COVID-19 MOLECULAR TEST ASSAY
KIT MISCELLANEOUS
COMMUNITY
Start: 2022-11-06

## 2022-12-22 RX ORDER — ROSUVASTATIN CALCIUM 40 MG/1
40 TABLET, COATED ORAL EVERY EVENING
Qty: 90 TABLET | Refills: 2 | Status: SHIPPED | OUTPATIENT
Start: 2022-12-22

## 2022-12-22 RX ORDER — EZETIMIBE 10 MG/1
10 TABLET ORAL DAILY
Qty: 90 TABLET | Refills: 2 | Status: SHIPPED | OUTPATIENT
Start: 2022-12-22

## 2022-12-22 RX ORDER — LISINOPRIL 20 MG/1
20 TABLET ORAL DAILY
Qty: 90 TABLET | Refills: 2 | Status: SHIPPED | OUTPATIENT
Start: 2022-12-22

## 2022-12-22 RX ORDER — ICOSAPENT ETHYL 1000 MG/1
1 CAPSULE ORAL 2 TIMES DAILY WITH MEALS
Qty: 180 CAPSULE | Refills: 2 | Status: SHIPPED | OUTPATIENT
Start: 2022-12-22

## 2022-12-22 RX ORDER — GLIMEPIRIDE 4 MG/1
4 TABLET ORAL 2 TIMES DAILY
Qty: 180 TABLET | Refills: 2 | Status: SHIPPED | OUTPATIENT
Start: 2022-12-22

## 2022-12-22 RX ORDER — POTASSIUM CHLORIDE 1500 MG/1
TABLET, EXTENDED RELEASE ORAL
COMMUNITY
Start: 2022-11-22

## 2022-12-22 RX ORDER — PIOGLITAZONEHYDROCHLORIDE 45 MG/1
TABLET ORAL
Qty: 90 TABLET | Refills: 2 | Status: SHIPPED | OUTPATIENT
Start: 2022-12-22

## 2023-05-23 NOTE — TELEPHONE ENCOUNTER
Per last office visit:    PLAN:  Continue present medications.  Repeat sedimentation rate in 6 months and if still low would recommend beginning to taper the prednisone down the 2.5 mg daily for a month or 2 and then repeat the sedimentation rate and then stop if still low and she is feeling well.  Patient is aware my pending care home and will be following up with Linda.     Patient's daughter would like a refill of Prednisone sent to [facility]. Patient's daughter is aware patient will need a repeat lab draw in July. Lab order placed. Medication refilled.    Rx sent in on 12/9

## 2023-11-15 RX ORDER — PERPHENAZINE 16 MG/1
TABLET, FILM COATED ORAL
Qty: 200 EACH | Refills: 3 | Status: SHIPPED | OUTPATIENT
Start: 2023-11-15

## 2023-11-15 NOTE — TELEPHONE ENCOUNTER
Rx Refill Note  Requested Prescriptions     Pending Prescriptions Disp Refills    Contour Next Test test strip [Pharmacy Med Name: CONTOUR NEXT TEST STRIP] 200 each 2     Sig: USE TWO STRIPS TO TEST DAILY      Last office visit with prescribing clinician: 7/13/2023   Last telemedicine visit with prescribing clinician: Visit date not found   Next office visit with prescribing clinician: 12/4/2023                         Would you like a call back once the refill request has been completed: [] Yes [] No    If the office needs to give you a call back, can they leave a voicemail: [] Yes [] No    Denisse Singletary  11/15/23, 07:43 EST

## 2023-11-28 DIAGNOSIS — I25.10 CORONARY ARTERY DISEASE INVOLVING NATIVE CORONARY ARTERY OF NATIVE HEART WITHOUT ANGINA PECTORIS: ICD-10-CM

## 2023-11-28 DIAGNOSIS — E11.29 TYPE 2 DIABETES MELLITUS WITH ALBUMINURIA: ICD-10-CM

## 2023-11-28 DIAGNOSIS — I10 ESSENTIAL HYPERTENSION: ICD-10-CM

## 2023-11-28 DIAGNOSIS — E78.5 HYPERLIPIDEMIA, UNSPECIFIED HYPERLIPIDEMIA TYPE: ICD-10-CM

## 2023-11-28 DIAGNOSIS — R80.9 TYPE 2 DIABETES MELLITUS WITH ALBUMINURIA: ICD-10-CM

## 2023-11-28 DIAGNOSIS — I65.23 BILATERAL CAROTID ARTERY STENOSIS: ICD-10-CM

## 2023-11-29 LAB
ALBUMIN SERPL-MCNC: 4.9 G/DL (ref 3.5–5.2)
ALBUMIN/GLOB SERPL: 2 G/DL
ALP SERPL-CCNC: 104 U/L (ref 39–117)
ALT SERPL-CCNC: 32 U/L (ref 1–41)
AST SERPL-CCNC: 28 U/L (ref 1–40)
BILIRUB SERPL-MCNC: 0.7 MG/DL (ref 0–1.2)
BUN SERPL-MCNC: 26 MG/DL (ref 8–23)
BUN/CREAT SERPL: 23.9 (ref 7–25)
CALCIUM SERPL-MCNC: 10 MG/DL (ref 8.6–10.5)
CHLORIDE SERPL-SCNC: 98 MMOL/L (ref 98–107)
CHOLEST SERPL-MCNC: 191 MG/DL (ref 0–200)
CO2 SERPL-SCNC: 27.7 MMOL/L (ref 22–29)
CREAT SERPL-MCNC: 1.09 MG/DL (ref 0.76–1.27)
EGFRCR SERPLBLD CKD-EPI 2021: 72.1 ML/MIN/1.73
GLOBULIN SER CALC-MCNC: 2.4 GM/DL
GLUCOSE SERPL-MCNC: 178 MG/DL (ref 65–99)
HBA1C MFR BLD: 7 % (ref 4.8–5.6)
HDLC SERPL-MCNC: 52 MG/DL (ref 40–60)
IMP & REVIEW OF LAB RESULTS: NORMAL
LDLC SERPL CALC-MCNC: 109 MG/DL (ref 0–100)
POTASSIUM SERPL-SCNC: 4.9 MMOL/L (ref 3.5–5.2)
PROT SERPL-MCNC: 7.3 G/DL (ref 6–8.5)
SODIUM SERPL-SCNC: 138 MMOL/L (ref 136–145)
TRIGL SERPL-MCNC: 170 MG/DL (ref 0–150)
TSH SERPL DL<=0.005 MIU/L-ACNC: 2.53 UIU/ML (ref 0.27–4.2)
VLDLC SERPL CALC-MCNC: 30 MG/DL (ref 5–40)

## 2023-12-04 ENCOUNTER — OFFICE VISIT (OUTPATIENT)
Dept: ENDOCRINOLOGY | Age: 73
End: 2023-12-04
Payer: MEDICARE

## 2023-12-04 ENCOUNTER — TELEPHONE (OUTPATIENT)
Dept: ENDOCRINOLOGY | Age: 73
End: 2023-12-04

## 2023-12-04 VITALS
SYSTOLIC BLOOD PRESSURE: 132 MMHG | HEART RATE: 84 BPM | OXYGEN SATURATION: 98 % | TEMPERATURE: 97 F | BODY MASS INDEX: 29.58 KG/M2 | HEIGHT: 69 IN | DIASTOLIC BLOOD PRESSURE: 70 MMHG

## 2023-12-04 DIAGNOSIS — Z86.73 HISTORY OF STROKE: ICD-10-CM

## 2023-12-04 DIAGNOSIS — I10 ESSENTIAL HYPERTENSION: ICD-10-CM

## 2023-12-04 DIAGNOSIS — R80.9 TYPE 2 DIABETES MELLITUS WITH ALBUMINURIA: Primary | ICD-10-CM

## 2023-12-04 DIAGNOSIS — E78.5 HYPERLIPIDEMIA, UNSPECIFIED HYPERLIPIDEMIA TYPE: ICD-10-CM

## 2023-12-04 DIAGNOSIS — E11.29 TYPE 2 DIABETES MELLITUS WITH ALBUMINURIA: Primary | ICD-10-CM

## 2023-12-04 DIAGNOSIS — I65.23 BILATERAL CAROTID ARTERY STENOSIS: ICD-10-CM

## 2023-12-04 DIAGNOSIS — I25.10 CORONARY ARTERY DISEASE INVOLVING NATIVE CORONARY ARTERY OF NATIVE HEART WITHOUT ANGINA PECTORIS: ICD-10-CM

## 2023-12-04 PROCEDURE — 3051F HG A1C>EQUAL 7.0%<8.0%: CPT | Performed by: INTERNAL MEDICINE

## 2023-12-04 PROCEDURE — 3075F SYST BP GE 130 - 139MM HG: CPT | Performed by: INTERNAL MEDICINE

## 2023-12-04 PROCEDURE — 3078F DIAST BP <80 MM HG: CPT | Performed by: INTERNAL MEDICINE

## 2023-12-04 PROCEDURE — 99214 OFFICE O/P EST MOD 30 MIN: CPT | Performed by: INTERNAL MEDICINE

## 2023-12-04 RX ORDER — MULTIVITAMIN
1 CAPSULE ORAL DAILY
COMMUNITY

## 2023-12-04 RX ORDER — RANOLAZINE 500 MG/1
500 TABLET, EXTENDED RELEASE ORAL
COMMUNITY
Start: 2023-09-20

## 2023-12-04 RX ORDER — INSULIN DEGLUDEC INJECTION 100 U/ML
INJECTION, SOLUTION SUBCUTANEOUS
Qty: 15 ML | Refills: 2 | Status: SHIPPED | OUTPATIENT
Start: 2023-12-04 | End: 2023-12-06 | Stop reason: SDUPTHER

## 2023-12-04 NOTE — TELEPHONE ENCOUNTER
PATIENT'S WIFE CALLED STATING THE INSURANCE IS NOT COVERING LANTUS FOR NEXT YEAR    THE ALTERNATIVES ARE CALVIN, TANIA, MICHAEL.

## 2023-12-04 NOTE — TELEPHONE ENCOUNTER
May switch from Lantus to Tresiba 18 units every evening.  Prescription sent to pharmacy.  Please notify patient's wife.

## 2023-12-04 NOTE — PROGRESS NOTES
Subjective   Dennis Arnold is a 73 y.o. male.     History of Present Illness     Patient has known diabetes mellitus since 2010.  He has been on glipizide 10 mg/day,  pioglitazone 45 mg/day, Novolog sliding scale, and Lantus 18 units every evening.  Metformin was discontinued in April 2023 when he was admitted for stroke.  He stopped Trulicity because of fatigue and nausea.  He did not start on Jardiance, or Januvia because of a high co-pay.  He stopped Invokana after 3 month because of a high co-pay. -150.   RBS <150 before supper.  He denies any hypoglycemic episodes.  He has gained 13 lbs in 4 mos.  He is eating and swallowing better recently.  Hemoglobin A1c done in November 2023 is 7.0%.     His last eye examination was in 1/23.  He has background retinopathy but did not require treatment.  He denies blurry vision.  He denies any tingling or burning in his hands or feet.   He has numbness on his left hand after the stroke.  He has albuminuria on urine sample taken in 12/22.  He is on lisinopril.     He has known coronary artery disease and hypertension.  He had a previous three-vessel coronary artery bypass and angioplasty with stent. He had paroxysmal atrial fibrillation after he had a stroke in 4/23.   He is on lisinopril.  He denies chest pain or shortness of breath.  He was seen by Dr. Sullivan in the past.     He had a stroke with left-sided weakness in April 2023.  Carotid ultrasound done in April 2023 showed 80% stenosis of the left internal carotid artery.  There was no mention of the right internal carotid artery.  He is going thru PT.  He follows with Dr. Jordan.     He has bilateral carotid stenosis and was told to have an 80% stenosis on the left side during his admission in April 2023.     He has hyperlipidemia and is on atorvastatin 80 mg/day, fish oil 1000 mg/day and Zetia 10 mg/day.  Vascepa was discontinued because of high cost.  He denies any myalgia.  Lipid panel done in November  "2023 are as follows: Cholesterol 191.  HDL 52.  .  Triglycerides 170.     He was diagnosed to have prostate cancer in 2019.  He has completed external radiation therapy.  He has stopped androgen deprivation therapy.  He has 2 kidney stones and thought he might have passed 1 stone a few weeks ago.  He follows with Dr. Samuel.     He has never had a colonoscopy.  He denies bowel changes.  He denies melena or hematochezia.  He has not rescheduled his colonoscopy because of Covid.     He does not smoke cigarettes.  He has stopped chewing tobacco since he had a stroke in April 2023.          The following portions of the patient's history were reviewed and updated as appropriate: allergies, current medications, past family history, past medical history, past social history, past surgical history, and problem list.    Review of Systems   Eyes:  Negative for visual disturbance.   Respiratory:  Negative for shortness of breath.    Cardiovascular:  Negative for chest pain and palpitations.   Gastrointestinal: Negative.    Genitourinary: Negative.    Musculoskeletal:  Negative for myalgias.   Neurological:  Positive for numbness.     Vitals:    12/04/23 1225   BP: 132/70   Pulse: 84   Temp: 97 °F (36.1 °C)   TempSrc: Temporal   SpO2: 98%   Height: 175.3 cm (69.02\")      Objective   Physical Exam  Constitutional:       Appearance: Normal appearance. He is not toxic-appearing or diaphoretic.   Eyes:      General: No scleral icterus.        Right eye: No discharge.         Left eye: No discharge.   Neck:      Vascular: No carotid bruit.   Cardiovascular:      Rate and Rhythm: Normal rate and regular rhythm.      Heart sounds: Normal heart sounds.   Pulmonary:      Breath sounds: Normal breath sounds. No rales.   Chest:      Chest wall: No tenderness.   Abdominal:      General: Bowel sounds are normal.      Palpations: Abdomen is soft.      Tenderness: There is no right CVA tenderness or left CVA tenderness. "   Musculoskeletal:      Right lower leg: No edema.      Left lower leg: No edema.   Lymphadenopathy:      Cervical: No cervical adenopathy.   Skin:     General: Skin is warm.   Neurological:      Mental Status: He is alert and oriented to person, place, and time.       Orders Only on 11/28/2023   Component Date Value Ref Range Status    TSH 11/28/2023 2.530  0.270 - 4.200 uIU/mL Final    Hemoglobin A1C 11/28/2023 7.00 (H)  4.80 - 5.60 % Final    Comment: Hemoglobin A1C Ranges:  Increased Risk for Diabetes  5.7% to 6.4%  Diabetes                     >= 6.5%  Diabetic Goal                < 7.0%      Total Cholesterol 11/28/2023 191  0 - 200 mg/dL Final    Comment: Cholesterol Reference Ranges  (U.S. Department of Health and Human Services ATP III  Classifications)  Desirable          <200 mg/dL  Borderline High    200-239 mg/dL  High Risk          >240 mg/dL  Triglyceride Reference Ranges  (U.S. Department of Health and Human Services ATP III  Classifications)  Normal           <150 mg/dL  Borderline High  150-199 mg/dL  High             200-499 mg/dL  Very High        >500 mg/dL  HDL Reference Ranges  (U.S. Department of Health and Human Services ATP III  Classifications)  Low     <40 mg/dl (major risk factor for CHD)  High    >60 mg/dl ('negative' risk factor for CHD)  LDL Reference Ranges  (U.S. Department of Health and Human Services ATP III  Classifications)  Optimal          <100 mg/dL  Near Optimal     100-129 mg/dL  Borderline High  130-159 mg/dL  High             160-189 mg/dL  Very High        >189 mg/dL      Triglycerides 11/28/2023 170 (H)  0 - 150 mg/dL Final    HDL Cholesterol 11/28/2023 52  40 - 60 mg/dL Final    VLDL Cholesterol Edmond 11/28/2023 30  5 - 40 mg/dL Final    LDL Chol Calc (NIH) 11/28/2023 109 (H)  0 - 100 mg/dL Final    Glucose 11/28/2023 178 (H)  65 - 99 mg/dL Final    BUN 11/28/2023 26 (H)  8 - 23 mg/dL Final    Creatinine 11/28/2023 1.09  0.76 - 1.27 mg/dL Final    EGFR Result 11/28/2023  72.1  >60.0 mL/min/1.73 Final    Comment: GFR Normal >60  Chronic Kidney Disease <60  Kidney Failure <15  The GFR formula is only valid for adults with stable renal  function between ages 18 and 70.      BUN/Creatinine Ratio 11/28/2023 23.9  7.0 - 25.0 Final    Sodium 11/28/2023 138  136 - 145 mmol/L Final    Potassium 11/28/2023 4.9  3.5 - 5.2 mmol/L Final    Chloride 11/28/2023 98  98 - 107 mmol/L Final    Total CO2 11/28/2023 27.7  22.0 - 29.0 mmol/L Final    Calcium 11/28/2023 10.0  8.6 - 10.5 mg/dL Final    Total Protein 11/28/2023 7.3  6.0 - 8.5 g/dL Final    Albumin 11/28/2023 4.9  3.5 - 5.2 g/dL Final    Globulin 11/28/2023 2.4  gm/dL Final    A/G Ratio 11/28/2023 2.0  g/dL Final    Total Bilirubin 11/28/2023 0.7  0.0 - 1.2 mg/dL Final    Alkaline Phosphatase 11/28/2023 104  39 - 117 U/L Final    AST (SGOT) 11/28/2023 28  1 - 40 U/L Final    ALT (SGPT) 11/28/2023 32  1 - 41 U/L Final    Interpretation 11/28/2023 Note   Final    Supplemental report is available.     Assessment & Plan   Diagnoses and all orders for this visit:    1. 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 Bilateral carotid artery stenosis  -     Ambulatory Referral to Vascular Surgery    3. Coronary artery disease involving native coronary artery of native heart without angina pectoris    4. Essential hypertension    5. History of stroke    6. Hyperlipidemia, unspecified hyperlipidemia type      Continue Lantus 18 units every evening, glipizide 10 mg/day, pioglitazone 45 mg/day and NovoLog on a correction scale.  Patient will check with their insurance company about which insulin to substitute Lantus with.  Vascular surgery consultation with Dr. Colt Valles.  Patient advised to bring in copies of imaging studies from [facility].  Will defer blood pressure control to the cardiologist.  Continue atorvastatin 80 mg/day, Zetia 10 mg/day, and fish oil 1000 mg/day.  Follow-up with Dr. Samuel.    RTC 3 mos with REINA Clifford NP and with me  in 6 mos.    Copy of my note sent to Dr. Colt Valles.

## 2023-12-06 RX ORDER — INSULIN DEGLUDEC INJECTION 100 U/ML
INJECTION, SOLUTION SUBCUTANEOUS
Qty: 15 ML | Refills: 2 | Status: SHIPPED | OUTPATIENT
Start: 2023-12-06

## 2024-01-10 NOTE — TELEPHONE ENCOUNTER
Rx Refill Note  Requested Prescriptions     Pending Prescriptions Disp Refills    metFORMIN (GLUCOPHAGE) 1000 MG tablet [Pharmacy Med Name: metFORMIN HCL 1,000 MG TABLET] 180 tablet 2     Sig: TAKE ONE TABLET BY MOUTH TWICE A DAY WITH MEALS      Last office visit with prescribing clinician: 12/4/2023   Last telemedicine visit with prescribing clinician: Visit date not found   Next office visit with prescribing clinician: 6/4/2024                         Would you like a call back once the refill request has been completed: [] Yes [] No    If the office needs to give you a call back, can they leave a voicemail: [] Yes [] No    Denisse Singletary  01/10/24, 09:34 EST

## 2024-02-22 RX ORDER — EZETIMIBE 10 MG/1
10 TABLET ORAL DAILY
Qty: 90 TABLET | Refills: 2 | Status: SHIPPED | OUTPATIENT
Start: 2024-02-22

## 2024-02-22 NOTE — TELEPHONE ENCOUNTER
Rx Refill Note  Requested Prescriptions     Pending Prescriptions Disp Refills    ezetimibe (ZETIA) 10 MG tablet [Pharmacy Med Name: EZETIMIBE 10 MG TABLET] 90 tablet 2     Sig: TAKE ONE TABLET BY MOUTH DAILY      Last office visit with prescribing clinician: 12/4/2023   Last telemedicine visit with prescribing clinician: Visit date not found   Next office visit with prescribing clinician: 6/4/2024                         Would you like a call back once the refill request has been completed: [] Yes [] No    If the office needs to give you a call back, can they leave a voicemail: [] Yes [] No    Amalia Singletary MA  02/22/24, 10:36 EST

## 2024-02-23 ENCOUNTER — TRANSCRIBE ORDERS (OUTPATIENT)
Dept: ADMINISTRATIVE | Facility: HOSPITAL | Age: 74
End: 2024-02-23
Payer: MEDICARE

## 2024-02-23 DIAGNOSIS — R97.21 INCREASING PROSTATE SPECIFIC ANTIGEN (PSA) LEVEL AFTER TREATMENT FOR MALIGNANT NEOPLASM OF PROSTATE: ICD-10-CM

## 2024-02-23 DIAGNOSIS — C61 PROSTATE CANCER: Primary | ICD-10-CM

## 2024-03-04 ENCOUNTER — OFFICE VISIT (OUTPATIENT)
Dept: ENDOCRINOLOGY | Age: 74
End: 2024-03-04
Payer: MEDICARE

## 2024-03-04 VITALS
BODY MASS INDEX: 29.58 KG/M2 | OXYGEN SATURATION: 94 % | SYSTOLIC BLOOD PRESSURE: 128 MMHG | TEMPERATURE: 97.2 F | HEART RATE: 87 BPM | DIASTOLIC BLOOD PRESSURE: 62 MMHG | HEIGHT: 69 IN

## 2024-03-04 DIAGNOSIS — R80.9 TYPE 2 DIABETES MELLITUS WITH ALBUMINURIA: Primary | ICD-10-CM

## 2024-03-04 DIAGNOSIS — E11.29 TYPE 2 DIABETES MELLITUS WITH ALBUMINURIA: Primary | ICD-10-CM

## 2024-03-04 DIAGNOSIS — I10 ESSENTIAL HYPERTENSION: ICD-10-CM

## 2024-03-04 DIAGNOSIS — E78.5 HYPERLIPIDEMIA, UNSPECIFIED HYPERLIPIDEMIA TYPE: ICD-10-CM

## 2024-03-04 PROCEDURE — 99214 OFFICE O/P EST MOD 30 MIN: CPT | Performed by: NURSE PRACTITIONER

## 2024-03-04 PROCEDURE — 3074F SYST BP LT 130 MM HG: CPT | Performed by: NURSE PRACTITIONER

## 2024-03-04 PROCEDURE — 3078F DIAST BP <80 MM HG: CPT | Performed by: NURSE PRACTITIONER

## 2024-03-04 RX ORDER — ASCORBIC ACID 500 MG
500 TABLET ORAL DAILY
COMMUNITY

## 2024-03-04 RX ORDER — LEVETIRACETAM 500 MG/1
500 TABLET ORAL
COMMUNITY
Start: 2024-02-26

## 2024-03-04 RX ORDER — POTASSIUM CHLORIDE 20 MEQ/1
20 TABLET, EXTENDED RELEASE ORAL DAILY
COMMUNITY
Start: 2024-02-26

## 2024-03-04 RX ORDER — ACETAMINOPHEN 325 MG/1
TABLET ORAL
COMMUNITY

## 2024-03-04 RX ORDER — BACLOFEN 10 MG/1
10 TABLET ORAL
COMMUNITY
Start: 2023-12-10

## 2024-03-04 NOTE — PROGRESS NOTES
Chief Complaint  Chief Complaint   Patient presents with    Diabetes       Subjective          History of Present Illness    Dennis Arnold 73 y.o. presents for a follow-up evaluation for type 2 DM    He has been diabetic since 2010    Pt is on the following medications for their DM: glipizide 10 mg daily, Actos 45 mg daily, Novolog per sliding scale (2 units per 50 above 150) and Tresiba 18 units every evening.            Metformin was discontinued in April 2023 when he was admitted for stroke.    Trulicity stopped due to fatigue and nausea.  Jardiance, Januvia and Invokana stopped due to cost        Denies diarrhea, constipation, chest pain, shortness of breath, vision changes or numbness and tingling in feet.    Pt does have diabetic retinopathy.  Last eye exam was 01/23    Pt does have nephropathy.  Patient is/not currently taking ACE/ARB    Pt denies diabetic neuropathy.  He has numbness on his left hand after the stroke     Pt does have a history of CAD - s/p three-vessel coronary artery bypass and angioplasty with stent   Pt had CVA in 04/2023 - left sided weakness    Last A1C in 11/23 was 7.0    Last microalbumin in 12/22 was positive          Blood Sugars    Blood glucoses are checked 2/day.    Fasting blood glucoses: 107-228    Pre-Supper blood glucoses: 119-310    Pt has no episodes of hypoglycemia.            Hyperlipidemia     Pt is currently taking atorvastatin 80 mg HS and Zetia 10 mg daily.  Vascepa stopped due to high cost     Last lipid panel in 11/23 showed Total 191, HDL 52,  and Triglycerides 170            Hypertension    Pt denies any chest pain, palpitations, shortness of breath or headache    Current regimen includes lisinopril 20 mg daily              Other History    He was diagnosed to have prostate cancer in 2019.  He has completed external radiation therapy.       02/25/24 patient was admitted for seizure; they believe it may have been from his stroke and he was started on  "Hilda          I have reviewed the patient's allergies, medicines, past medical hx, family hx and social hx.    Objective   Vital Signs:   /62   Pulse 87   Temp 97.2 °F (36.2 °C) (Temporal)   Ht 175.3 cm (69.02\")   SpO2 94%   BMI 29.58 kg/m²       Physical Exam   Physical Exam  Constitutional:       General: He is not in acute distress.     Appearance: Normal appearance. He is not diaphoretic.   HENT:      Head: Normocephalic and atraumatic.   Eyes:      General:         Right eye: No discharge.         Left eye: No discharge.   Skin:     General: Skin is warm and dry.   Neurological:      Mental Status: He is alert.   Psychiatric:         Mood and Affect: Mood normal.         Behavior: Behavior normal.                    Results Review:   Hemoglobin A1C   Date Value Ref Range Status   11/28/2023 7.00 (H) 4.80 - 5.60 % Final     Comment:     Hemoglobin A1C Ranges:  Increased Risk for Diabetes  5.7% to 6.4%  Diabetes                     >= 6.5%  Diabetic Goal                < 7.0%       Triglycerides   Date Value Ref Range Status   11/28/2023 170 (H) 0 - 150 mg/dL Final     HDL Cholesterol   Date Value Ref Range Status   11/28/2023 52 40 - 60 mg/dL Final     LDL Chol Calc (NIH)   Date Value Ref Range Status   11/28/2023 109 (H) 0 - 100 mg/dL Final     VLDL Cholesterol Edmond   Date Value Ref Range Status   11/28/2023 30 5 - 40 mg/dL Final         Assessment and Plan {CC Problem List  Visit Diagnosis  ROS  Review (Popup)  Health Maintenance  Quality  BestPractice  Medications  SmartSets  SnapShot Encounters  Media :23  Diagnoses and all orders for this visit:    1. 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 Hyperlipidemia, unspecified hyperlipidemia type  -     Lipid Panel    Check labs today  Continue with statin and Zetia      3. Essential hypertension    Stable  Continue with current medication regimen  Defer management to PCP           No refills needed at this time      Labs today  RTC on 06/04/24 with Dr. Lutz      Follow Up     Patient was given instructions and counseling regarding her condition or for health maintenance advice. Please see specific information pulled into the AVS if appropriate.              Diamond Clifford, STEPHANIE  03/04/24

## 2024-03-05 LAB
CHOLEST SERPL-MCNC: 184 MG/DL (ref 100–199)
FRUCTOSAMINE SERPL-SCNC: 337 UMOL/L (ref 0–285)
HBA1C MFR BLD: 7.4 % (ref 4.8–5.6)
HDLC SERPL-MCNC: 47 MG/DL
IMP & REVIEW OF LAB RESULTS: NORMAL
LDLC SERPL CALC-MCNC: 103 MG/DL (ref 0–99)
TRIGL SERPL-MCNC: 195 MG/DL (ref 0–149)
UNABLE TO VOID: NORMAL
VLDLC SERPL CALC-MCNC: 34 MG/DL (ref 5–40)

## 2024-03-11 ENCOUNTER — HOSPITAL ENCOUNTER (OUTPATIENT)
Dept: PET IMAGING | Facility: HOSPITAL | Age: 74
Discharge: HOME | End: 2024-03-11
Payer: MEDICARE

## 2024-03-11 DIAGNOSIS — C61 PROSTATE CANCER: ICD-10-CM

## 2024-03-11 DIAGNOSIS — I65.23 BILATERAL CAROTID ARTERY STENOSIS: Primary | ICD-10-CM

## 2024-03-11 DIAGNOSIS — R97.21 INCREASING PROSTATE SPECIFIC ANTIGEN (PSA) LEVEL AFTER TREATMENT FOR MALIGNANT NEOPLASM OF PROSTATE: ICD-10-CM

## 2024-03-11 PROCEDURE — 0 PIFLUFOLASTAT F 18 9 MCI SOLUTION PREFILLED SYRINGE: Performed by: UROLOGY

## 2024-03-11 PROCEDURE — A9595 PIFLUFOLASTAT F 18 9 MCI SOLUTION PREFILLED SYRINGE: HCPCS | Performed by: UROLOGY

## 2024-03-11 PROCEDURE — 78815 PET IMAGE W/CT SKULL-THIGH: CPT

## 2024-03-11 RX ADMIN — PIFLUFOLASTAT F-18 1 DOSE: 80 INJECTION INTRAVENOUS at 12:55

## 2024-06-04 ENCOUNTER — OFFICE VISIT (OUTPATIENT)
Dept: ENDOCRINOLOGY | Age: 74
End: 2024-06-04
Payer: MEDICARE

## 2024-06-04 VITALS
TEMPERATURE: 97.8 F | OXYGEN SATURATION: 97 % | HEART RATE: 80 BPM | BODY MASS INDEX: 29.58 KG/M2 | SYSTOLIC BLOOD PRESSURE: 116 MMHG | HEIGHT: 69 IN | DIASTOLIC BLOOD PRESSURE: 82 MMHG

## 2024-06-04 DIAGNOSIS — I10 ESSENTIAL HYPERTENSION: ICD-10-CM

## 2024-06-04 DIAGNOSIS — I65.23 BILATERAL CAROTID ARTERY STENOSIS: ICD-10-CM

## 2024-06-04 DIAGNOSIS — Z85.46 HISTORY OF PROSTATE CANCER: ICD-10-CM

## 2024-06-04 DIAGNOSIS — E11.29 TYPE 2 DIABETES MELLITUS WITH ALBUMINURIA: Primary | ICD-10-CM

## 2024-06-04 DIAGNOSIS — R80.9 TYPE 2 DIABETES MELLITUS WITH ALBUMINURIA: Primary | ICD-10-CM

## 2024-06-04 DIAGNOSIS — Z86.73 HISTORY OF STROKE: ICD-10-CM

## 2024-06-04 DIAGNOSIS — Z87.09 HISTORY OF BRONCHITIS: ICD-10-CM

## 2024-06-04 DIAGNOSIS — E78.5 HYPERLIPIDEMIA, UNSPECIFIED HYPERLIPIDEMIA TYPE: ICD-10-CM

## 2024-06-04 DIAGNOSIS — I25.10 CORONARY ARTERY DISEASE INVOLVING NATIVE CORONARY ARTERY OF NATIVE HEART WITHOUT ANGINA PECTORIS: ICD-10-CM

## 2024-06-04 PROCEDURE — 3079F DIAST BP 80-89 MM HG: CPT | Performed by: INTERNAL MEDICINE

## 2024-06-04 PROCEDURE — 3074F SYST BP LT 130 MM HG: CPT | Performed by: INTERNAL MEDICINE

## 2024-06-04 PROCEDURE — 3051F HG A1C>EQUAL 7.0%<8.0%: CPT | Performed by: INTERNAL MEDICINE

## 2024-06-04 PROCEDURE — 99214 OFFICE O/P EST MOD 30 MIN: CPT | Performed by: INTERNAL MEDICINE

## 2024-06-04 RX ORDER — METHYLPHENIDATE HYDROCHLORIDE 5 MG/1
TABLET ORAL
COMMUNITY
Start: 2024-05-14 | End: 2024-06-10

## 2024-06-04 RX ORDER — BLOOD-GLUCOSE SENSOR
1 EACH MISCELLANEOUS
Qty: 6 EACH | Refills: 3 | Status: SHIPPED | OUTPATIENT
Start: 2024-06-04

## 2024-06-04 RX ORDER — KETOROLAC TROMETHAMINE 30 MG/ML
1 INJECTION, SOLUTION INTRAMUSCULAR; INTRAVENOUS DAILY
Qty: 1 EACH | Refills: 1 | Status: SHIPPED | OUTPATIENT
Start: 2024-06-04

## 2024-06-04 RX ORDER — PREDNISONE 10 MG/1
10 TABLET ORAL
COMMUNITY
Start: 2024-05-31 | End: 2024-06-05

## 2024-06-04 RX ORDER — BETAMETHASONE DIPROPIONATE 0.05 %
OINTMENT (GRAM) TOPICAL
COMMUNITY
Start: 2024-05-06

## 2024-06-04 RX ORDER — AZITHROMYCIN 250 MG/1
TABLET, FILM COATED ORAL
COMMUNITY
Start: 2024-05-31

## 2024-06-04 RX ORDER — IPRATROPIUM BROMIDE AND ALBUTEROL SULFATE 2.5; .5 MG/3ML; MG/3ML
3 SOLUTION RESPIRATORY (INHALATION)
COMMUNITY
Start: 2024-05-31 | End: 2024-06-30

## 2024-06-04 NOTE — PROGRESS NOTES
Subjective   Dennis Arnold is a 73 y.o. male.     History of Present Illness       Patient has known diabetes mellitus since 2010.  He has been on glipizide 10 mg/day,  pioglitazone 45 mg/day, Novolog sliding scale, and Tresiba 18 units every evening.  He ran out of Lantus 3 days ago.  Metformin was discontinued in April 2023 when he was admitted for stroke.  He stopped Trulicity because of fatigue and nausea.  He did not start on Jardiance, or Januvia because of a high co-pay.  He stopped Invokana after 3 month because of a high co-pay. FBS and RBS <150.  He denies any hypoglycemic episodes.  His last meal was last night.       His last eye examination was in 2/24.  He has bilateral retinopathy without macular edema.  He denies blurry vision.  He denies any tingling or burning in his hands or feet.   He has numbness on his left hand after the stroke.  He has albuminuria on urine sample taken in 12/22.  He is on lisinopril.     He has known coronary artery disease and hypertension.  He had a previous three-vessel coronary artery bypass and angioplasty with stent. He had paroxysmal atrial fibrillation after he had a stroke in 4/23.   He is on lisinopril.  He denies chest pain or shortness of breath.  He follows with Dr. Landon Wolfe.     He had a stroke with left-sided weakness in April 2023.      He has bilateral carotid stenosis and was told to have an 80% stenosis on the left side during his admission in April 2023.  He will see Dr. Dover  in 7/24.     He has hyperlipidemia and is on atorvastatin 80 mg/day, fish oil 1200 mg/day and Zetia 10 mg/day.  Vascepa was discontinued because of high cost.  He denies any myalgia.      He was diagnosed to have prostate cancer in 2019.  He has completed external radiation therapy.  He has stopped androgen deprivation therapy.  He has 2 kidney stones.  He follows with Dr. Samuel.     He has never had a colonoscopy.  He denies bowel changes.  He denies melena or hematochezia.   "He has not rescheduled his colonoscopy because of Covid.    He was diagnosed to have sleep apnea in 2024 and recently started on CPAP.    He is being treated for acute bronchitis with Zithromax and prednisone.     He does not smoke cigarettes.  He has stopped chewing tobacco since he had a stroke in April 2023.    The following portions of the patient's history were reviewed and updated as appropriate: allergies, current medications, past family history, past medical history, past social history, past surgical history, and problem list.    Review of Systems   Respiratory:  Positive for cough and wheezing. Negative for shortness of breath.    Cardiovascular:  Negative for chest pain and palpitations.   Gastrointestinal: Negative.    Genitourinary: Negative.    Musculoskeletal:  Negative for myalgias.   Neurological:  Positive for numbness.     Vitals:    06/04/24 1128   BP: 116/82   Pulse: 80   Temp: 97.8 °F (36.6 °C)   TempSrc: Temporal   SpO2: 97%   Height: 175.3 cm (69.02\")      Objective   Physical Exam  Office Visit on 03/04/2024   Component Date Value Ref Range Status    Fructosamine 03/04/2024 337 (H)  0 - 285 umol/L Final    Comment: Published reference interval for apparently healthy subjects  between age 20 and 60 is 205 - 285 umol/L and in a poorly  controlled diabetic population is 228 - 563 umol/L with a  mean of 396 umol/L.      Hemoglobin A1C 03/04/2024 7.4 (H)  4.8 - 5.6 % Final    Comment:          Prediabetes: 5.7 - 6.4           Diabetes: >6.4           Glycemic control for adults with diabetes: <7.0      Total Cholesterol 03/04/2024 184  100 - 199 mg/dL Final    Triglycerides 03/04/2024 195 (H)  0 - 149 mg/dL Final    HDL Cholesterol 03/04/2024 47  >39 mg/dL Final    VLDL Cholesterol Edmond 03/04/2024 34  5 - 40 mg/dL Final    LDL Chol Calc (NIH) 03/04/2024 103 (H)  0 - 99 mg/dL Final    Unable to Void 03/04/2024 Comment   Final    Comment: The patient was not able to render a urine sample and has " been  instructed to return for a urine collection at their earliest  convenience.  The urine testing that you have requested has  been deleted from this report.  When the patient returns and  provides a urine specimen, the urine testing will be performed  and separately reported.      Interpretation 03/04/2024 Note   Final    Supplemental report is available.     Assessment & Plan   Diagnoses and all orders for this visit:    1. 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 Coronary artery disease involving native coronary artery of native heart without angina pectoris    3. Essential hypertension    4. History of stroke    5. Hyperlipidemia, unspecified hyperlipidemia type  -     Lipid Panel  -     TSH Rfx On Abnormal To Free T4    6. Bilateral carotid artery stenosis  -     Lipid Panel    7. History of prostate cancer    8. History of bronchitis  -     CBC & Differential      Continue Tresiba 18 units every evening, NovoLog correction scale, glipizide 10 mg daily, and pioglitazone 45 mg daily.    Continue lisinopril per cardiologist.    Continue atorvastatin 80 mg/day, Zetia 10 mg/day and fish oil 1200 mg/day.    Continue CPAP.    Follow-up with Dr. Dover and Dr. Lee as scheduled.    RTC 3 mos with REINA Clifford NP.    Copy of my note sent to Chito Shaw NP, Dr. Landon Wolfe, Dr. Dover, and Dr. Lee.

## 2024-06-05 ENCOUNTER — PRIOR AUTHORIZATION (OUTPATIENT)
Dept: ENDOCRINOLOGY | Age: 74
End: 2024-06-05
Payer: MEDICARE

## 2024-06-27 ENCOUNTER — TELEPHONE (OUTPATIENT)
Dept: ENDOCRINOLOGY | Age: 74
End: 2024-06-27
Payer: MEDICARE

## 2024-06-27 RX ORDER — PEN NEEDLE, DIABETIC 32GX 5/32"
NEEDLE, DISPOSABLE MISCELLANEOUS
Qty: 400 EACH | Refills: 4 | Status: SHIPPED | OUTPATIENT
Start: 2024-06-27

## 2024-06-27 NOTE — TELEPHONE ENCOUNTER
Incoming Refill Request      Medication requested (name and dose): pen needles    Pharm[facility] where request should be sent[facility]     Additional details provided by patient:     Best call back number: [phone]    Does the patient have less than a 3 day supply:  [] Yes  [] No    Juanis Harrington Rep  06/27/24, 12:45 EDT

## 2024-07-08 RX ORDER — POTASSIUM CHLORIDE 20 MEQ/1
20 TABLET, EXTENDED RELEASE ORAL DAILY
OUTPATIENT
Start: 2024-07-08

## 2024-07-08 RX ORDER — LISINOPRIL 20 MG/1
20 TABLET ORAL DAILY
Qty: 90 TABLET | Refills: 2 | Status: SHIPPED | OUTPATIENT
Start: 2024-07-08

## 2024-07-08 RX ORDER — ATORVASTATIN CALCIUM 80 MG/1
80 TABLET, FILM COATED ORAL DAILY
Qty: 90 TABLET | Refills: 2 | Status: SHIPPED | OUTPATIENT
Start: 2024-07-08

## 2024-07-08 NOTE — TELEPHONE ENCOUNTER
Patient called stating she needs medication refills on Potassium Klorcon, Atorvastatin and Lyzinopril. Wife has been giving him 10 MG of Lyzinopril from what was left before so he doesn't completely go without it. He is completely out of these medications.  Please send to [facility] in [place]  
Rx Refill Note  Requested Prescriptions     Pending Prescriptions Disp Refills    potassium chloride (KLOR-CON M20) 20 MEQ CR tablet       Sig: Take 1 tablet by mouth Daily.    atorvastatin (LIPITOR) 80 MG tablet 90 tablet 0     Sig: Take 1 tablet by mouth Daily.    lisinopril (PRINIVIL,ZESTRIL) 20 MG tablet 90 tablet 2     Sig: Take 1 tablet by mouth Daily.      Last office visit with prescribing clinician: 3/4/2024   Last telemedicine visit with prescribing clinician: Visit date not found   Next office visit with prescribing clinician: 9/11/2024                         Would you like a call back once the refill request has been completed: [] Yes [] No    If the office needs to give you a call back, can they leave a voicemail: [] Yes [] No    Denisse Singletary  07/08/24, 12:19 EDT    
eyeglasses

## 2024-07-17 PROBLEM — Z86.73 HISTORY OF ISCHEMIC RIGHT MCA STROKE: Status: ACTIVE | Noted: 2024-07-17

## 2024-07-17 PROBLEM — Z79.01 CHRONIC ANTICOAGULATION: Status: ACTIVE | Noted: 2024-07-17

## 2024-07-17 NOTE — PROGRESS NOTES
"(Epic downtime)    Chief Complaint  Carotid Artery Disease    Subjective      HPI: Dennis Arnold is a 73 y.o. male who presents for follow-up of carotid stenosis.  He has history of right hemispheric stroke with left hemiparesis.  Presents today asymptomatic with respect to new neurologic symptoms.  Has a moderate to dense fixed left hemiparesis associated with his stroke.  No recent illnesses or hospitalizations.  Takes aspirin 325 mg daily and full dose apixaban.    Objective   Vital Signs:  /64   Ht 175.3 cm (69.02\")   Wt 86.2 kg (190 lb)   BMI 28.04 kg/m²   Estimated body mass index is 28.04 kg/m² as calculated from the following:    Height as of this encounter: 175.3 cm (69.02\").    Weight as of this encounter: 86.2 kg (190 lb).      Dennis Arnold  reports that he has never smoked. His smokeless tobacco use includes chew..     Personal review of data: Report of noncontrast CT brain February, 2024.  Bilateral carotid duplex scan performed today.     Assessment and Plan     Diagnoses and all orders for this visit:    1. Bilateral carotid artery stenosis (Primary)  -     Duplex Carotid Ultrasound CAR; Future    2. History of ischemic right MCA stroke  -     Duplex Carotid Ultrasound CAR; Future    3. Chronic anticoagulation    Summary:  73-year-old gentleman who experienced right hemispheric stroke with left hemiparesis and speech impediment after cardiac catheterization April 2023.  Identified with less than 50% right ICA stenosis and moderate left ICA stenosis.  Returns for follow-up taking full dose apixaban anticoagulation and atorvastatin.  Recent noncontrast CT brain February, 2024 demonstrates encephalomalacia and gliosis throughout the right frontal, parietal and temporal lobes consistent with chronic MCA stroke.  Duplex today with less than 50% right ICA stenosis.  50-69% left ICA stenosis, with velocities 197/43, ratio 2.5.  Has right carotid atherosclerosis without plaque and moderate grade, " asymptomatic left ICA stenosis.  Recommend ongoing noninterventional management.  Could reduce aspirin to 81 mg daily but patient prefers 325 mg daily.  I would recommend duplex scan 1 year, but patient very concerned about potential for another stroke.  Thus recheck carotid duplex scan in 6 months.    Follow Up     Return in about 6 months (around 1/19/2025).  Patient was given instructions and counseling regarding his condition or for health maintenance advice. Please see specific information pulled into the AVS if appropriate.

## 2024-07-19 ENCOUNTER — OFFICE VISIT (OUTPATIENT)
Age: 74
End: 2024-07-19
Payer: MEDICARE

## 2024-07-19 ENCOUNTER — HOSPITAL ENCOUNTER (OUTPATIENT)
Facility: HOSPITAL | Age: 74
Discharge: HOME | End: 2024-07-19
Payer: MEDICARE

## 2024-07-19 VITALS
DIASTOLIC BLOOD PRESSURE: 64 MMHG | SYSTOLIC BLOOD PRESSURE: 128 MMHG | HEIGHT: 69 IN | WEIGHT: 190 LBS | BODY MASS INDEX: 28.14 KG/M2

## 2024-07-19 DIAGNOSIS — I65.23 BILATERAL CAROTID ARTERY STENOSIS: Primary | ICD-10-CM

## 2024-07-19 DIAGNOSIS — Z79.01 CHRONIC ANTICOAGULATION: ICD-10-CM

## 2024-07-19 DIAGNOSIS — Z86.73 HISTORY OF ISCHEMIC RIGHT MCA STROKE: ICD-10-CM

## 2024-07-19 DIAGNOSIS — I65.23 BILATERAL CAROTID ARTERY STENOSIS: ICD-10-CM

## 2024-07-19 LAB
BH CV XLRA MEAS LEFT CAROTID BULB EDV: 43 CM/SEC
BH CV XLRA MEAS LEFT CAROTID BULB PSV: 196 CM/SEC
BH CV XLRA MEAS LEFT DIST CCA EDV: 20 CM/SEC
BH CV XLRA MEAS LEFT DIST CCA PSV: 78 CM/SEC
BH CV XLRA MEAS LEFT DIST ICA EDV: 31 CM/SEC
BH CV XLRA MEAS LEFT DIST ICA PSV: 121 CM/SEC
BH CV XLRA MEAS LEFT ICA/CCA RATIO: 2.51
BH CV XLRA MEAS LEFT MID CCA EDV: 18 CM/SEC
BH CV XLRA MEAS LEFT MID CCA PSV: 87 CM/SEC
BH CV XLRA MEAS LEFT MID ICA EDV: 25 CM/SEC
BH CV XLRA MEAS LEFT MID ICA PSV: 130 CM/SEC
BH CV XLRA MEAS LEFT PROX CCA EDV: 18 CM/SEC
BH CV XLRA MEAS LEFT PROX CCA PSV: 107 CM/SEC
BH CV XLRA MEAS LEFT PROX ECA EDV: 18 CM/SEC
BH CV XLRA MEAS LEFT PROX ECA PSV: 305 CM/SEC
BH CV XLRA MEAS LEFT PROX ICA EDV: 28 CM/SEC
BH CV XLRA MEAS LEFT PROX ICA PSV: 157 CM/SEC
BH CV XLRA MEAS LEFT PROX SCLA PSV: 117 CM/SEC
BH CV XLRA MEAS LEFT VERTEBRAL A EDV: 17 CM/SEC
BH CV XLRA MEAS LEFT VERTEBRAL A PSV: 68 CM/SEC
BH CV XLRA MEAS RIGHT CAROTID BULB EDV: 11 CM/SEC
BH CV XLRA MEAS RIGHT CAROTID BULB PSV: 66 CM/SEC
BH CV XLRA MEAS RIGHT DIST CCA EDV: 13 CM/SEC
BH CV XLRA MEAS RIGHT DIST CCA PSV: 79 CM/SEC
BH CV XLRA MEAS RIGHT DIST ICA EDV: 17 CM/SEC
BH CV XLRA MEAS RIGHT DIST ICA PSV: 69 CM/SEC
BH CV XLRA MEAS RIGHT ICA/CCA RATIO: 1.1
BH CV XLRA MEAS RIGHT MID CCA EDV: 12 CM/SEC
BH CV XLRA MEAS RIGHT MID CCA PSV: 77 CM/SEC
BH CV XLRA MEAS RIGHT MID ICA EDV: 18 CM/SEC
BH CV XLRA MEAS RIGHT MID ICA PSV: 87 CM/SEC
BH CV XLRA MEAS RIGHT PROX CCA EDV: 9 CM/SEC
BH CV XLRA MEAS RIGHT PROX CCA PSV: 114 CM/SEC
BH CV XLRA MEAS RIGHT PROX ECA EDV: 5 CM/SEC
BH CV XLRA MEAS RIGHT PROX ECA PSV: 148 CM/SEC
BH CV XLRA MEAS RIGHT PROX ICA EDV: 15 CM/SEC
BH CV XLRA MEAS RIGHT PROX ICA PSV: 70 CM/SEC
BH CV XLRA MEAS RIGHT PROX SCLA PSV: 196 CM/SEC
BH CV XLRA MEAS RIGHT VERTEBRAL A EDV: 11 CM/SEC
BH CV XLRA MEAS RIGHT VERTEBRAL A PSV: 47 CM/SEC
LEFT ARM BP: NORMAL MMHG
RIGHT ARM BP: NORMAL MMHG

## 2024-07-19 PROCEDURE — 93880 EXTRACRANIAL BILAT STUDY: CPT

## 2024-08-23 NOTE — TELEPHONE ENCOUNTER
Incoming Refill Request      Medication requested (name and dose): glipizide, potassium      Pharmacy where request should be sent: [facility]     Additional details provided by patient:     Best call back number: [phone]    Does the patient have less than a 3 day supply:  [] Yes  [] No    Juanis Harrington Rep  08/23/24, 14:51 EDT

## 2024-08-26 RX ORDER — GLIPIZIDE 10 MG/1
10 TABLET ORAL DAILY
Qty: 90 TABLET | Refills: 1 | Status: SHIPPED | OUTPATIENT
Start: 2024-08-26

## 2024-08-26 RX ORDER — POTASSIUM CHLORIDE 20 MEQ/1
20 TABLET, EXTENDED RELEASE ORAL DAILY
OUTPATIENT
Start: 2024-08-26

## 2024-09-05 LAB
ALBUMIN SERPL-MCNC: 4.3 G/DL (ref 3.5–5.2)
ALBUMIN/GLOB SERPL: 1.6 G/DL
ALP SERPL-CCNC: 91 U/L (ref 39–117)
ALT SERPL-CCNC: 23 U/L (ref 1–41)
APPEARANCE UR: CLEAR
AST SERPL-CCNC: 21 U/L (ref 1–40)
BACTERIA #/AREA URNS HPF: ABNORMAL /HPF
BASOPHILS # BLD AUTO: 0.05 10*3/MM3 (ref 0–0.2)
BASOPHILS NFR BLD AUTO: 0.8 % (ref 0–1.5)
BILIRUB SERPL-MCNC: 0.6 MG/DL (ref 0–1.2)
BILIRUB UR QL STRIP: NEGATIVE
BUN SERPL-MCNC: 16 MG/DL (ref 8–23)
BUN/CREAT SERPL: 20.3 (ref 7–25)
CALCIUM SERPL-MCNC: 9.1 MG/DL (ref 8.6–10.5)
CASTS URNS MICRO: ABNORMAL
CHLORIDE SERPL-SCNC: 101 MMOL/L (ref 98–107)
CHOLEST SERPL-MCNC: 188 MG/DL (ref 0–200)
CO2 SERPL-SCNC: 26.4 MMOL/L (ref 22–29)
COLOR UR: ABNORMAL
CREAT SERPL-MCNC: 0.79 MG/DL (ref 0.76–1.27)
EGFRCR SERPLBLD CKD-EPI 2021: 94.4 ML/MIN/1.73
EOSINOPHIL # BLD AUTO: 0.14 10*3/MM3 (ref 0–0.4)
EOSINOPHIL NFR BLD AUTO: 2.2 % (ref 0.3–6.2)
EPI CELLS #/AREA URNS HPF: ABNORMAL /HPF
ERYTHROCYTE [DISTWIDTH] IN BLOOD BY AUTOMATED COUNT: 12.8 % (ref 12.3–15.4)
GLOBULIN SER CALC-MCNC: 2.7 GM/DL
GLUCOSE SERPL-MCNC: 229 MG/DL (ref 65–99)
GLUCOSE UR QL STRIP: ABNORMAL
HBA1C MFR BLD: 7.1 % (ref 4.8–5.6)
HCT VFR BLD AUTO: 45.1 % (ref 37.5–51)
HDLC SERPL-MCNC: 50 MG/DL (ref 40–60)
HGB BLD-MCNC: 15 G/DL (ref 13–17.7)
HGB UR QL STRIP: NEGATIVE
IMM GRANULOCYTES # BLD AUTO: 0.02 10*3/MM3 (ref 0–0.05)
IMM GRANULOCYTES NFR BLD AUTO: 0.3 % (ref 0–0.5)
IMP & REVIEW OF LAB RESULTS: NORMAL
KETONES UR QL STRIP: ABNORMAL
LDLC SERPL CALC-MCNC: 71 MG/DL (ref 0–100)
LEUKOCYTE ESTERASE UR QL STRIP: NEGATIVE
LYMPHOCYTES # BLD AUTO: 1.86 10*3/MM3 (ref 0.7–3.1)
LYMPHOCYTES NFR BLD AUTO: 29.9 % (ref 19.6–45.3)
MCH RBC QN AUTO: 31.6 PG (ref 26.6–33)
MCHC RBC AUTO-ENTMCNC: 33.3 G/DL (ref 31.5–35.7)
MCV RBC AUTO: 95.1 FL (ref 79–97)
MONOCYTES # BLD AUTO: 0.38 10*3/MM3 (ref 0.1–0.9)
MONOCYTES NFR BLD AUTO: 6.1 % (ref 5–12)
NEUTROPHILS # BLD AUTO: 3.78 10*3/MM3 (ref 1.7–7)
NEUTROPHILS NFR BLD AUTO: 60.7 % (ref 42.7–76)
NITRITE UR QL STRIP: NEGATIVE
NRBC BLD AUTO-RTO: 0 /100 WBC (ref 0–0.2)
PH UR STRIP: 5.5 [PH] (ref 5–8)
PLATELET # BLD AUTO: 190 10*3/MM3 (ref 140–450)
POTASSIUM SERPL-SCNC: 3.9 MMOL/L (ref 3.5–5.2)
PROT SERPL-MCNC: 7 G/DL (ref 6–8.5)
PROT UR QL STRIP: ABNORMAL
RBC # BLD AUTO: 4.74 10*6/MM3 (ref 4.14–5.8)
RBC #/AREA URNS HPF: ABNORMAL /HPF
SODIUM SERPL-SCNC: 139 MMOL/L (ref 136–145)
SP GR UR STRIP: >1.03 (ref 1–1.03)
TRIGL SERPL-MCNC: 430 MG/DL (ref 0–150)
TSH SERPL DL<=0.005 MIU/L-ACNC: 1.19 UIU/ML (ref 0.27–4.2)
UROBILINOGEN UR STRIP-MCNC: ABNORMAL MG/DL
VLDLC SERPL CALC-MCNC: 67 MG/DL (ref 5–40)
WBC # BLD AUTO: 6.23 10*3/MM3 (ref 3.4–10.8)
WBC #/AREA URNS HPF: ABNORMAL /HPF

## 2024-09-06 NOTE — PROGRESS NOTES
Vannesa Arnold is a 72 y.o. male.     History of Present Illness  F/u for dm 2, hyperlipidemia, CAD/ testing bs 2 x day / last dm eye exam 12/27/21 with dr Olson / last dm foot exam 1/10/22 with dr Lutz         Patient is a 71-year-old male who came in for follow-up.     Patient has known diabetes mellitus since 2010.  He has been on glimepiride 4 mg BID,  pioglitazone 30 mg/day, and  metformin 1000 mg twice a day.  He stopped Trulicity 1 month ago because of fatigue and nausea.  He did not start on Jardiance, or Januvia because of a high co-pay.  He stopped Invokana after 3 month because of a high co-pay. FBS 100-150.  After supper glucose 140-150.  He denies any hypoglycemic episodes.  He has no significant weight change since June 2022.     His last eye examination was in December 2021.  He has background retinopathy but did not require treatment.  He denies blurry vision.  He denies any numbness, tingling or burning in his hands or feet.   He has albuminuria on urine sample taken in 12/22.  He is on lisinopril.     He has known coronary artery disease and hypertension.  He had a previous three-vessel coronary artery bypass and angioplasty with stent.  He is on Coreg and lisinopril.  He denies chest pain or shortness of breath.  He follows with Dr. Sullivan.     He has hyperlipidemia and has been on Crestor 40 mg/day, Vascepa 1 g twice a day and Zetia 10 mg/day.  He denies any myalgia.  Lipid profile done in 12/22 are as follows: Cholesterol 193.  HDL 42.  LDL 93.  Triglycerides 350.     He was diagnosed to have prostate cancer in 2019.  He has completed external radiation therapy.  He has stopped androgen deprivation therapy.  He follows with Dr. Azul.     He has never had a colonoscopy.  He denies bowel changes.  He denies melena or hematochezia.  He has not rescheduled his colonoscopy because of Covid.     He chews tobacco.  He does not smoke cigarettes      He had 3 Moderna Covid vaccines  "without major side effects.     The following portions of the patient's history were reviewed and updated as appropriate: allergies, current medications, past family history, past medical history, past social history, past surgical history and problem list.    Review of Systems   HENT: Negative.    Eyes: Negative for discharge and visual disturbance.   Respiratory: Negative for shortness of breath.    Cardiovascular: Negative for chest pain and palpitations.   Gastrointestinal: Negative.    Endocrine: Negative for cold intolerance and heat intolerance.   Genitourinary: Negative.    Musculoskeletal: Negative for myalgias.   Neurological: Negative for numbness.     Vitals:    12/22/22 0809   BP: 130/70   Pulse: 88   Temp: 97.3 °F (36.3 °C)   TempSrc: Temporal   SpO2: 96%   Weight: 90.9 kg (200 lb 6.4 oz)   Height: 175.3 cm (69.02\")      Objective   Physical Exam  Constitutional:       Appearance: Normal appearance. He is not toxic-appearing or diaphoretic.   HENT:      Mouth/Throat:      Pharynx: No oropharyngeal exudate or posterior oropharyngeal erythema.   Eyes:      General: No scleral icterus.        Right eye: No discharge.         Left eye: No discharge.   Neck:      Vascular: No carotid bruit.   Cardiovascular:      Rate and Rhythm: Normal rate and regular rhythm.      Heart sounds: Normal heart sounds. No murmur heard.    No friction rub. No gallop.   Pulmonary:      Effort: Pulmonary effort is normal. No respiratory distress.      Breath sounds: Normal breath sounds. No stridor. No rales.   Chest:      Chest wall: No tenderness.   Abdominal:      General: Bowel sounds are normal.      Palpations: Abdomen is soft.      Tenderness: There is no right CVA tenderness or left CVA tenderness.   Musculoskeletal:         General: Normal range of motion.      Right lower leg: No edema.      Left lower leg: No edema.   Lymphadenopathy:      Cervical: No cervical adenopathy.   Neurological:      General: No focal " deficit present.      Mental Status: He is alert and oriented to person, place, and time.      Comments: Intact light touch on lower ext   Psychiatric:         Mood and Affect: Mood normal.         Behavior: Behavior normal.       Orders Only on 12/15/2022   Component Date Value Ref Range Status   • Vitamin B-12 12/15/2022 525  211 - 946 pg/mL Final    Results may be falsely increased if patient taking Biotin.   • Creatinine, Urine 12/15/2022 129.5  mg/dL Final   • Total Protein, Urine 12/15/2022 41.8  mg/dL Final   • Protein/Creatinine Ratio 12/15/2022 322.8 (H)  0.0 - 200.0 mg/G Crea Final   • Hemoglobin A1C 12/15/2022 8.30 (H)  4.80 - 5.60 % Final    Comment: Hemoglobin A1C Ranges:  Increased Risk for Diabetes  5.7% to 6.4%  Diabetes                     >= 6.5%  Diabetic Goal                < 7.0%     • TSH 12/15/2022 3.580  0.270 - 4.200 uIU/mL Final   • Total Cholesterol 12/15/2022 193  0 - 200 mg/dL Final    Comment: Cholesterol Reference Ranges  (U.S. Department of Health and Human Services ATP III  Classifications)  Desirable          <200 mg/dL  Borderline High    200-239 mg/dL  High Risk          >240 mg/dL  Triglyceride Reference Ranges  (U.S. Department of Health and Human Services ATP III  Classifications)  Normal           <150 mg/dL  Borderline High  150-199 mg/dL  High             200-499 mg/dL  Very High        >500 mg/dL  HDL Reference Ranges  (U.S. Department of Health and Human Services ATP III  Classifications)  Low     <40 mg/dl (major risk factor for CHD)  High    >60 mg/dl ('negative' risk factor for CHD)  LDL Reference Ranges  (U.S. Department of Health and Human Services ATP III  Classifications)  Optimal          <100 mg/dL  Near Optimal     100-129 mg/dL  Borderline High  130-159 mg/dL  High             160-189 mg/dL  Very High        >189 mg/dL     • Triglycerides 12/15/2022 350 (H)  0 - 150 mg/dL Final   • HDL Cholesterol 12/15/2022 42  40 - 60 mg/dL Final   • VLDL Cholesterol Edmond  12/15/2022 58 (H)  5 - 40 mg/dL Final   • LDL Chol Calc (NIH) 12/15/2022 93  0 - 100 mg/dL Final   • Glucose 12/15/2022 191 (H)  65 - 99 mg/dL Final   • BUN 12/15/2022 30 (H)  8 - 23 mg/dL Final   • Creatinine 12/15/2022 1.20  0.76 - 1.27 mg/dL Final   • EGFR Result 12/15/2022 64.7  >60.0 mL/min/1.73 Final    Comment: National Kidney Foundation and American Society of  Nephrology (ASN) Task Force recommended calculation based  on the Chronic Kidney Disease Epidemiology Collaboration  (CKD-EPI) equation refit without adjustment for race.  GFR Normal >60  Chronic Kidney Disease <60  Kidney Failure <15  The GFR formula is only valid for adults with stable renal  function between ages 18 and 70.     • BUN/Creatinine Ratio 12/15/2022 25.0  7.0 - 25.0 Final   • Sodium 12/15/2022 136  136 - 145 mmol/L Final   • Potassium 12/15/2022 5.4 (H)  3.5 - 5.2 mmol/L Final   • Chloride 12/15/2022 100  98 - 107 mmol/L Final   • Total CO2 12/15/2022 26.4  22.0 - 29.0 mmol/L Final   • Calcium 12/15/2022 11.0 (H)  8.6 - 10.5 mg/dL Final   • Total Protein 12/15/2022 6.9  6.0 - 8.5 g/dL Final   • Albumin 12/15/2022 4.70  3.50 - 5.20 g/dL Final   • Globulin 12/15/2022 2.2  gm/dL Final   • A/G Ratio 12/15/2022 2.1  g/dL Final   • Total Bilirubin 12/15/2022 0.8  0.0 - 1.2 mg/dL Final   • Alkaline Phosphatase 12/15/2022 111  39 - 117 U/L Final   • AST (SGOT) 12/15/2022 25  1 - 40 U/L Final   • ALT (SGPT) 12/15/2022 32  1 - 41 U/L Final   • Interpretation 12/15/2022 Note   Final    Supplemental report is available.     Assessment & Plan   Diagnoses and all orders for this visit:    1. 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 Coronary artery disease involving native coronary artery of native heart without angina pectoris    3. S/P angioplasty with stent    4. Hyperlipidemia, unspecified hyperlipidemia type    5. Prostate cancer (HCC)    6. Type 2 diabetes mellitus without complication, without long-term current use of  insulin (HCC)  -     glimepiride (AMARYL) 4 MG tablet; Take 1 tablet by mouth 2 (Two) Times a Day.  Dispense: 180 tablet; Refill: 2    Other orders  -     pioglitazone (ACTOS) 45 MG tablet; 1 tablet daily  Dispense: 90 tablet; Refill: 2  -     ezetimibe (ZETIA) 10 MG tablet; Take 1 tablet by mouth Daily.  Dispense: 90 tablet; Refill: 2  -     icosapent ethyl (VASCEPA) 1 g capsule capsule; Take 1 g by mouth 2 (Two) Times a Day With Meals.  Dispense: 180 capsule; Refill: 2  -     lisinopril (PRINIVIL,ZESTRIL) 20 MG tablet; Take 1 tablet by mouth Daily.  Dispense: 90 tablet; Refill: 2  -     metFORMIN (GLUCOPHAGE) 1000 MG tablet; Take 1 tablet by mouth 2 (Two) Times a Day With Meals.  Dispense: 180 tablet; Refill: 2  -     rosuvastatin (CRESTOR) 40 MG tablet; Take 1 tablet by mouth Every Evening.  Dispense: 90 tablet; Refill: 2      Increase pioglitazone to 45 mg/day.  Continue glimepiride 4 mg twice daily and metformin 1000 mg twice daily.  Continue Coreg and lisinopril.  Continue Crestor 40 mg/day, Vascepa 1 g twice a day and Zetia 10 mg/day  Follow-up with urologist  Advised to get PCP.    RTC 4 mos.        60

## 2024-09-11 ENCOUNTER — OFFICE VISIT (OUTPATIENT)
Dept: ENDOCRINOLOGY | Age: 74
End: 2024-09-11
Payer: MEDICARE

## 2024-09-11 VITALS
BODY MASS INDEX: 29.62 KG/M2 | HEIGHT: 69 IN | TEMPERATURE: 97.1 F | HEART RATE: 87 BPM | SYSTOLIC BLOOD PRESSURE: 124 MMHG | OXYGEN SATURATION: 92 % | DIASTOLIC BLOOD PRESSURE: 84 MMHG | WEIGHT: 200 LBS

## 2024-09-11 DIAGNOSIS — E78.5 HYPERLIPIDEMIA, UNSPECIFIED HYPERLIPIDEMIA TYPE: ICD-10-CM

## 2024-09-11 DIAGNOSIS — I10 ESSENTIAL HYPERTENSION: ICD-10-CM

## 2024-09-11 DIAGNOSIS — E11.29 TYPE 2 DIABETES MELLITUS WITH ALBUMINURIA: Primary | ICD-10-CM

## 2024-09-11 DIAGNOSIS — R80.9 TYPE 2 DIABETES MELLITUS WITH ALBUMINURIA: Primary | ICD-10-CM

## 2024-09-11 NOTE — PATIENT INSTRUCTIONS
Continue with glipizide 10 mg daily  Continue with pioglitazone 45 mg daily  Continue with Novolog per sliding scale  Increase Tresiba 20 units every evening

## 2024-09-11 NOTE — PROGRESS NOTES
Chief Complaint  Chief Complaint   Patient presents with    Diabetes     Type 2: Pt michelle is attached, is up to date on eye exam, does have hx of retinopathy, no hx of neuropathy.         Subjective          History of Present Illness    Dennis Arnold 73 y.o. presents for a follow-up for Type 2 Diabetes    Pt's spouse accompanied pt during today's visit.    He was diagnosed with diabetes in 2010    Pt is on the following medications for their diabetes: glipizide 10 mg daily (had been off a week or two), pioglitazone 45 mg daily, Novolog per sliding scale (2 units per 50 above 150) and Tresiba 18 units every evening.       Metformin was discontinued in April 2023 when he was admitted for stroke.    Trulicity stopped due to fatigue and nausea.  Jardiance, Januvia and Invokana stopped due to cost      Denies diarrhea, constipation, chest pain, shortness of breath, vision changes or numbness and tingling in feet/hands.    Pt does have mild non-proliferative diabetic retinopathy without macular edema in right eye and moderate non-proliferative diabetic retinopathy without macular edema in left eye.  Last eye exam was 02/24    Pt does have nephropathy.  Patient is currently taking ACE    Pt denies diabetic neuropathy.  He has numbness on his left hand after the stroke      Pt does have a history of CAD - s/p three-vessel coronary artery bypass and angioplasty with stent   Pt had CVA in 04/2023 - left sided weakness    Last A1C in 09/24 was 7.1          Blood Sugars    Blood glucoses are checked via Michelle.    Fasting blood glucoses: mid 100s    Pre-meal blood glucoses: mid 100s- 200s    Pt has rare episodes of hypoglycemia.            Sensor Data    Time in range 48%  High 48%  Very high 4%  Low 0%  Very low 0%      Average Glucose - 182 mg/dL      Sensor Wear - 56 %            Hyperlipidemia     Pt is currently taking atorvastatin 80 mg HS and Zetia 10 mg daily.  Vascepa stopped due to cost     Last lipid panel in 09/24  "showed Total 188, HDL 50, LDL 71 and Triglycerides 430            Hypertension    Pt denies any chest pain, palpitations, shortness of breath or headache    Current regimen includes lisinopril 20 mg daily             Other History     He was diagnosed to have prostate cancer in 2019.  He has completed external radiation therapy.         02/25/24 patient was admitted for seizure; they believe it may have been from his stroke and he was started on Keppra          I have reviewed the patient's allergies, medicines, past medical hx, family hx and social hx.    Objective   Vital Signs:   /84   Pulse 87   Temp 97.1 °F (36.2 °C) (Temporal)   Ht 175.3 cm (69.02\")   Wt 90.7 kg (200 lb) Comment: patient provided.  SpO2 92%   BMI 29.52 kg/m²       Physical Exam   Physical Exam  Constitutional:       General: He is not in acute distress.     Appearance: Normal appearance. He is not diaphoretic.   HENT:      Head: Normocephalic and atraumatic.   Eyes:      General:         Right eye: No discharge.         Left eye: No discharge.   Skin:     General: Skin is warm and dry.   Neurological:      Mental Status: He is alert.   Psychiatric:         Mood and Affect: Mood normal.         Behavior: Behavior normal.                    Results Review:   Hemoglobin A1C   Date Value Ref Range Status   09/04/2024 7.10 (H) 4.80 - 5.60 % Final     Comment:     Hemoglobin A1C Ranges:  Increased Risk for Diabetes  5.7% to 6.4%  Diabetes                     >= 6.5%  Diabetic Goal                < 7.0%       Triglycerides   Date Value Ref Range Status   09/04/2024 430 (H) 0 - 150 mg/dL Final     HDL Cholesterol   Date Value Ref Range Status   09/04/2024 50 40 - 60 mg/dL Final     LDL Chol Calc (NIH)   Date Value Ref Range Status   09/04/2024 71 0 - 100 mg/dL Final     VLDL Cholesterol Edmond   Date Value Ref Range Status   09/04/2024 67 (H) 5 - 40 mg/dL Final         Assessment and Plan {CC Problem List  Visit Diagnosis  ROS  Review " (Popup)  Health Maintenance  Quality  BestPractice  Medications  SmartSets  SnapShot Encounters  Media :23  Diagnoses and all orders for this visit:    1. Type 2 diabetes mellitus with albuminuria (Primary)  -     Fructosamine; Future  -     Hemoglobin A1c; Future  -     Comprehensive Metabolic Panel; Future  -     Microalbumin / Creatinine Urine Ratio - Urine, Clean Catch; Future    A1c is 7.1%  Continue with glipizide 10 mg daily  Continue with pioglitazone 45 mg daily  Continue with Novolog per sliding scale  Increase Tresiba 20 units every evening      2. Hyperlipidemia, unspecified hyperlipidemia type  -     Comprehensive Metabolic Panel; Future  -     Lipid Panel; Future    Total cholesterol is good  LDL is slightly above goal at 71 (goal less than 70) continue with statin and Zetia and work on dietary modification to get lower  Triglycerides are elevated, decrease dietary fat and carbohydrate intake.      3. Essential hypertension  -     Comprehensive Metabolic Panel; Future    Stable  Continue with current medication regimen  Defer management to PCP      No refills needed at this time      RTC in 4 months with me, labs prior and 8 months with Dr. Lutz      Follow Up     Patient was given instructions and counseling regarding her condition or for health maintenance advice. Please see specific information pulled into the AVS if appropriate.                Diamond Clifford, APRN  09/11/24

## 2024-11-25 RX ORDER — EZETIMIBE 10 MG/1
10 TABLET ORAL DAILY
Qty: 90 TABLET | Refills: 1 | Status: SHIPPED | OUTPATIENT
Start: 2024-11-25

## 2024-11-25 NOTE — TELEPHONE ENCOUNTER
Rx Refill Note  Requested Prescriptions     Pending Prescriptions Disp Refills    ezetimibe (ZETIA) 10 MG tablet [Pharmacy Med Name: EZETIMIBE 10 MG TABLET] 90 tablet 2     Sig: TAKE 1 TABLET BY MOUTH DAILY      Last office visit with prescribing clinician: 6/4/2024   Last telemedicine visit with prescribing clinician: Visit date not found   Next office visit with prescribing clinician: 5/12/2025                         Would you like a call back once the refill request has been completed: [] Yes [] No    If the office needs to give you a call back, can they leave a voicemail: [] Yes [] No    Denisse Singletary  11/25/24, 14:43 EST

## 2024-12-06 RX ORDER — INSULIN DEGLUDEC 100 U/ML
20 INJECTION, SOLUTION SUBCUTANEOUS DAILY
Qty: 18 ML | Refills: 1 | Status: SHIPPED | OUTPATIENT
Start: 2024-12-06

## 2024-12-06 NOTE — TELEPHONE ENCOUNTER
Rx Refill Note  Requested Prescriptions     Pending Prescriptions Disp Refills    insulin degludec (Tresiba FlexTouch) 100 UNIT/ML solution pen-injector injection [Pharmacy Med Name: TRESIBA FLEXTOUCH 100 UNIT/ML] 15 mL 2     Sig: INJECT UNDER THE SKIN 18 UNITS EVERY EVENING. PRIME NEEDLE WITH 2 UNITS BEFORE EACH USE      Last office visit with prescribing clinician: 6/4/2024   Last telemedicine visit with prescribing clinician: Visit date not found   Next office visit with prescribing clinician: 5/12/2025                         Would you like a call back once the refill request has been completed: [] Yes [] No    If the office needs to give you a call back, can they leave a voicemail: [] Yes [] No    Tamia Salazar MA  12/06/24, 12:07 EST

## 2025-01-03 ENCOUNTER — LAB (OUTPATIENT)
Dept: ENDOCRINOLOGY | Age: 75
End: 2025-01-03
Payer: MEDICARE

## 2025-01-03 DIAGNOSIS — I10 ESSENTIAL HYPERTENSION: ICD-10-CM

## 2025-01-03 DIAGNOSIS — E11.29 TYPE 2 DIABETES MELLITUS WITH ALBUMINURIA: ICD-10-CM

## 2025-01-03 DIAGNOSIS — R80.9 TYPE 2 DIABETES MELLITUS WITH ALBUMINURIA: ICD-10-CM

## 2025-01-03 DIAGNOSIS — E78.5 HYPERLIPIDEMIA, UNSPECIFIED HYPERLIPIDEMIA TYPE: ICD-10-CM

## 2025-01-04 LAB
ALBUMIN SERPL-MCNC: 3.8 G/DL (ref 3.5–5.2)
ALBUMIN/GLOB SERPL: 1.3 G/DL
ALP SERPL-CCNC: 98 U/L (ref 39–117)
ALT SERPL-CCNC: 17 U/L (ref 1–41)
AST SERPL-CCNC: 22 U/L (ref 1–40)
BILIRUB SERPL-MCNC: 0.6 MG/DL (ref 0–1.2)
BUN SERPL-MCNC: 16 MG/DL (ref 8–23)
BUN/CREAT SERPL: 19 (ref 7–25)
CALCIUM SERPL-MCNC: 9.4 MG/DL (ref 8.6–10.5)
CHLORIDE SERPL-SCNC: 104 MMOL/L (ref 98–107)
CHOLEST SERPL-MCNC: 165 MG/DL (ref 0–200)
CO2 SERPL-SCNC: 27.9 MMOL/L (ref 22–29)
CREAT SERPL-MCNC: 0.84 MG/DL (ref 0.76–1.27)
EGFRCR SERPLBLD CKD-EPI 2021: 92.1 ML/MIN/1.73
FRUCTOSAMINE SERPL-SCNC: 310 UMOL/L (ref 0–285)
GLOBULIN SER CALC-MCNC: 2.9 GM/DL
GLUCOSE SERPL-MCNC: 146 MG/DL (ref 65–99)
HBA1C MFR BLD: 6.7 % (ref 4.8–5.6)
HDLC SERPL-MCNC: 53 MG/DL (ref 40–60)
IMP & REVIEW OF LAB RESULTS: NORMAL
LDLC SERPL CALC-MCNC: 90 MG/DL (ref 0–100)
POTASSIUM SERPL-SCNC: 3.1 MMOL/L (ref 3.5–5.2)
PROT SERPL-MCNC: 6.7 G/DL (ref 6–8.5)
SODIUM SERPL-SCNC: 147 MMOL/L (ref 136–145)
TRIGL SERPL-MCNC: 127 MG/DL (ref 0–150)
UNABLE TO VOID: NORMAL
VLDLC SERPL CALC-MCNC: 22 MG/DL (ref 5–40)

## 2025-02-06 ENCOUNTER — OFFICE VISIT (OUTPATIENT)
Dept: ENDOCRINOLOGY | Age: 75
End: 2025-02-06
Payer: MEDICARE

## 2025-02-06 VITALS
TEMPERATURE: 97.5 F | WEIGHT: 200 LBS | SYSTOLIC BLOOD PRESSURE: 118 MMHG | HEART RATE: 90 BPM | HEIGHT: 69 IN | OXYGEN SATURATION: 94 % | DIASTOLIC BLOOD PRESSURE: 72 MMHG | BODY MASS INDEX: 29.62 KG/M2

## 2025-02-06 DIAGNOSIS — E78.5 HYPERLIPIDEMIA, UNSPECIFIED HYPERLIPIDEMIA TYPE: ICD-10-CM

## 2025-02-06 DIAGNOSIS — R80.9 TYPE 2 DIABETES MELLITUS WITH ALBUMINURIA: Primary | ICD-10-CM

## 2025-02-06 DIAGNOSIS — I10 ESSENTIAL HYPERTENSION: ICD-10-CM

## 2025-02-06 DIAGNOSIS — E11.29 TYPE 2 DIABETES MELLITUS WITH ALBUMINURIA: Primary | ICD-10-CM

## 2025-02-06 RX ORDER — TAMSULOSIN HYDROCHLORIDE 0.4 MG/1
1 CAPSULE ORAL DAILY
COMMUNITY

## 2025-02-06 RX ORDER — INSULIN DEGLUDEC 100 U/ML
18 INJECTION, SOLUTION SUBCUTANEOUS DAILY
Start: 2025-02-06

## 2025-02-06 RX ORDER — EVOLOCUMAB 140 MG/ML
140 INJECTION, SOLUTION SUBCUTANEOUS
Qty: 6 ML | Refills: 1 | Status: SHIPPED | OUTPATIENT
Start: 2025-02-06

## 2025-02-06 NOTE — PROGRESS NOTES
Chief Complaint  Chief Complaint   Patient presents with    Diabetes     Type 2: Pt michelle is attached, is up to date on eye exam, does have hx of retinopathy, no hx of neuropathy.         Subjective          History of Present Illness    Dennis Arnold 74 y.o. presents for a follow-up for Type 2 Diabetes     Pt's spouse accompanied pt during today's visit.     He was diagnosed with diabetes in 2010     Pt is on the following medications for their diabetes: glipizide 10 mg daily, pioglitazone 45 mg daily, Novolog per sliding scale (2 units per 50 above 150) and Tresiba 20 units every evening.         Metformin was discontinued in April 2023 when he was admitted for stroke.    Trulicity stopped due to fatigue and nausea.  Jardiance, Januvia and Invokana stopped due to cost      Denies diarrhea, constipation, chest pain, shortness of breath, vision changes or numbness and tingling in feet/hands.    Pt does have mild non-proliferative diabetic retinopathy without macular edema in right eye and moderate non-proliferative diabetic retinopathy without macular edema in left eye.  Last eye exam was 02/24     Pt does have nephropathy.  Patient is currently taking ACE     Pt denies diabetic neuropathy.  He has numbness on his left hand after the stroke      Pt does have a history of CAD - s/p three-vessel coronary artery bypass and angioplasty with stent   Pt had CVA in 04/2023 - left sided weakness    Last A1C in 01/25 was 6.70  Fructosamine in 01/25 was 310          Blood Sugars    Blood glucoses are checked via Michelle.    Fasting blood glucoses: 60s- low 100s    Pre-meal blood glucoses: low to high 100s    Pt has episodes of hypoglycemia mostly in the early morning hours          Sensor Data    Time in range 82%  High 13%  Very high 1%  Low 4%  Very low 0%      Average Glucose - 134 mg/dL      Sensor Wear - 98 %              Hyperlipidemia     Pt is currently taking atorvastatin 80 mg HS and Zetia 10 mg daily.  Vascepa  "stopped due to cost     Last lipid panel in 01/25 showed Total 165, HDL 53, LDL 90 and Triglycerides 127            Hypertension    Pt denies any chest pain, palpitations, shortness of breath or headache    Current regimen includes lisinopril 20 mg daily                  Other History     He was diagnosed to have prostate cancer in 2019.  He has completed external radiation therapy.         02/25/24 patient was admitted for seizure; they believe it may have been from his stroke and he was started on Keppra            I have reviewed the patient's allergies, medicines, past medical hx, family hx and social hx.    Objective   Vital Signs:   /72   Pulse 90   Temp 97.5 °F (36.4 °C) (Oral)   Ht 175.3 cm (69.02\")   Wt 90.7 kg (200 lb) Comment: patient provided  SpO2 94%   BMI 29.52 kg/m²       Physical Exam   Physical Exam  Constitutional:       General: He is not in acute distress.     Appearance: Normal appearance. He is not diaphoretic.   HENT:      Head: Normocephalic and atraumatic.   Eyes:      General:         Right eye: No discharge.         Left eye: No discharge.   Skin:     General: Skin is warm and dry.   Neurological:      Mental Status: He is alert.   Psychiatric:         Mood and Affect: Mood normal.         Behavior: Behavior normal.                    Results Review:   Hemoglobin A1C   Date Value Ref Range Status   01/03/2025 6.70 (H) 4.80 - 5.60 % Final     Comment:     Hemoglobin A1C Ranges:  Increased Risk for Diabetes  5.7% to 6.4%  Diabetes                     >= 6.5%  Diabetic Goal                < 7.0%       Triglycerides   Date Value Ref Range Status   01/03/2025 127 0 - 150 mg/dL Final     HDL Cholesterol   Date Value Ref Range Status   01/03/2025 53 40 - 60 mg/dL Final     LDL Chol Calc (NIH)   Date Value Ref Range Status   01/03/2025 90 0 - 100 mg/dL Final     VLDL Cholesterol Edmond   Date Value Ref Range Status   01/03/2025 22 5 - 40 mg/dL Final         Assessment and Plan {CC " Problem List  Visit Diagnosis  ROS  Review (Popup)  [facility] Maintenance  Quality  BestPractice  Medications  SmartSets  SnapShot Encounters  Media :23  Diagnoses and all orders for this visit:    1. Type 2 diabetes mellitus with albuminuria (Primary)  -     insulin degludec (Tresiba FlexTouch) 100 UNIT/ML solution pen-injector injection; Inject 18 Units under the skin into the appropriate area as directed Daily.  -     Hemoglobin A1c; Future  -     Comprehensive Metabolic Panel; Future  -     Microalbumin / Creatinine Urine Ratio - Urine, Clean Catch; Future    A1c is 6.7% but with morning hypoglycemia  Continue with glipizide 10 mg daily  Continue with pioglitazone 45 mg daily  Continue with Novolog per sliding scale (2 units per 50 above 150)   Decrease Tresiba 18 units every evening  Continue with Michelle      2. Hyperlipidemia, unspecified hyperlipidemia type  -     Evolocumab (Repatha SureClick) solution auto-injector SureClick injection; Inject 1 mL under the skin into the appropriate area as directed Every 14 (Fourteen) Days.  Dispense: 6 mL; Refill: 1  -     Comprehensive Metabolic Panel; Future  -     Lipid Panel; Future    Most recent LDL is 90; goal is less then 70 given cardiovascular history  Pt is on max dose statin and Zetia  Will try for Repatha/Praulent but affordability may be an issue      3. Essential hypertension  -     Comprehensive Metabolic Panel; Future    Stable  Continue with current medication regimen  Defer management to PCP        No refills needed at this time      RTC on 05/12/25 with Dr. Lutz, labs prior - needs lab appointment          Follow Up     Patient was given instructions and counseling regarding her condition or for health maintenance advice. Please see specific information pulled into the AVS if appropriate.                STEPHANIE Knight  02/06/25

## 2025-02-06 NOTE — PATIENT INSTRUCTIONS
Continue with glipizide 10 mg daily  Continue with pioglitazone 45 mg daily  Continue with Novolog per sliding scale (2 units per 50 above 150)   Decrease Tresiba 18 units every evening

## 2025-02-19 ENCOUNTER — TELEPHONE (OUTPATIENT)
Dept: ENDOCRINOLOGY | Age: 75
End: 2025-02-19
Payer: MEDICARE

## 2025-02-19 ENCOUNTER — PRIOR AUTHORIZATION (OUTPATIENT)
Dept: ENDOCRINOLOGY | Age: 75
End: 2025-02-19
Payer: MEDICARE

## 2025-02-19 NOTE — TELEPHONE ENCOUNTER
Approved today by [employer]  Request Reference Number: [id number]. REPATHA SURE INJ 140MG/ML is approved through 08/19/2025. Your patient may now fill this prescription and it will be covered.  Authorization Expiration Date: 8/19/2025

## 2025-02-19 NOTE — TELEPHONE ENCOUNTER
Received a fax from [facility] that Repatha is no longer covered.     I will start a PA.

## 2025-02-21 ENCOUNTER — OFFICE VISIT (OUTPATIENT)
Age: 75
End: 2025-02-21
Payer: MEDICARE

## 2025-02-21 ENCOUNTER — HOSPITAL ENCOUNTER (OUTPATIENT)
Facility: HOSPITAL | Age: 75
Discharge: HOME | End: 2025-02-21
Payer: MEDICARE

## 2025-02-21 VITALS
HEIGHT: 69 IN | SYSTOLIC BLOOD PRESSURE: 138 MMHG | DIASTOLIC BLOOD PRESSURE: 76 MMHG | WEIGHT: 200 LBS | BODY MASS INDEX: 29.62 KG/M2

## 2025-02-21 DIAGNOSIS — Z86.73 HISTORY OF ISCHEMIC RIGHT MCA STROKE: ICD-10-CM

## 2025-02-21 DIAGNOSIS — I65.23 CAROTID STENOSIS, BILATERAL: ICD-10-CM

## 2025-02-21 DIAGNOSIS — I65.23 BILATERAL CAROTID ARTERY STENOSIS: ICD-10-CM

## 2025-02-21 DIAGNOSIS — I65.23 BILATERAL CAROTID ARTERY STENOSIS: Primary | ICD-10-CM

## 2025-02-21 LAB
BH CV XLRA MEAS LEFT CAROTID BULB EDV: 45.5 CM/SEC
BH CV XLRA MEAS LEFT CAROTID BULB PSV: 180.3 CM/SEC
BH CV XLRA MEAS LEFT DIST CCA EDV: -19.6 CM/SEC
BH CV XLRA MEAS LEFT DIST CCA PSV: -71.5 CM/SEC
BH CV XLRA MEAS LEFT DIST ICA EDV: -24.5 CM/SEC
BH CV XLRA MEAS LEFT DIST ICA PSV: -69.4 CM/SEC
BH CV XLRA MEAS LEFT ICA/CCA RATIO: 2.52
BH CV XLRA MEAS LEFT MID CCA EDV: 15.4 CM/SEC
BH CV XLRA MEAS LEFT MID CCA PSV: 70.8 CM/SEC
BH CV XLRA MEAS LEFT MID ICA EDV: -31.8 CM/SEC
BH CV XLRA MEAS LEFT MID ICA PSV: -129.5 CM/SEC
BH CV XLRA MEAS LEFT PROX CCA EDV: 14.7 CM/SEC
BH CV XLRA MEAS LEFT PROX CCA PSV: 81.3 CM/SEC
BH CV XLRA MEAS LEFT PROX ECA EDV: -20.4 CM/SEC
BH CV XLRA MEAS LEFT PROX ECA PSV: -216.4 CM/SEC
BH CV XLRA MEAS LEFT PROX ICA EDV: -32.9 CM/SEC
BH CV XLRA MEAS LEFT PROX ICA PSV: -135 CM/SEC
BH CV XLRA MEAS LEFT PROX SCLA PSV: 91.1 CM/SEC
BH CV XLRA MEAS LEFT VERTEBRAL A EDV: 21 CM/SEC
BH CV XLRA MEAS LEFT VERTEBRAL A PSV: 75 CM/SEC
BH CV XLRA MEAS RIGHT CAROTID BULB EDV: -14 CM/SEC
BH CV XLRA MEAS RIGHT CAROTID BULB PSV: -58.8 CM/SEC
BH CV XLRA MEAS RIGHT DIST CCA EDV: -13 CM/SEC
BH CV XLRA MEAS RIGHT DIST CCA PSV: -65.9 CM/SEC
BH CV XLRA MEAS RIGHT DIST ICA EDV: -19.2 CM/SEC
BH CV XLRA MEAS RIGHT DIST ICA PSV: -56 CM/SEC
BH CV XLRA MEAS RIGHT ICA/CCA RATIO: 1.23
BH CV XLRA MEAS RIGHT MID CCA EDV: 13.3 CM/SEC
BH CV XLRA MEAS RIGHT MID CCA PSV: 72.1 CM/SEC
BH CV XLRA MEAS RIGHT MID ICA EDV: 20.2 CM/SEC
BH CV XLRA MEAS RIGHT MID ICA PSV: 81.2 CM/SEC
BH CV XLRA MEAS RIGHT PROX CCA EDV: -12.5 CM/SEC
BH CV XLRA MEAS RIGHT PROX CCA PSV: -77.6 CM/SEC
BH CV XLRA MEAS RIGHT PROX ECA EDV: -8.7 CM/SEC
BH CV XLRA MEAS RIGHT PROX ECA PSV: -128.2 CM/SEC
BH CV XLRA MEAS RIGHT PROX ICA EDV: -13.3 CM/SEC
BH CV XLRA MEAS RIGHT PROX ICA PSV: -51.8 CM/SEC
BH CV XLRA MEAS RIGHT PROX SCLA PSV: 131.7 CM/SEC
BH CV XLRA MEAS RIGHT VERTEBRAL A EDV: 12.3 CM/SEC
BH CV XLRA MEAS RIGHT VERTEBRAL A PSV: 42.8 CM/SEC
LEFT ARM BP: NORMAL MMHG
RIGHT ARM BP: NORMAL MMHG

## 2025-02-21 PROCEDURE — 93880 EXTRACRANIAL BILAT STUDY: CPT

## 2025-02-21 RX ORDER — GLIPIZIDE 10 MG/1
10 TABLET ORAL DAILY
Qty: 90 TABLET | Refills: 1 | Status: SHIPPED | OUTPATIENT
Start: 2025-02-21

## 2025-02-21 NOTE — TELEPHONE ENCOUNTER
Rx Refill Note  Requested Prescriptions     Pending Prescriptions Disp Refills    glipizide (GLUCOTROL) 10 MG tablet [Pharmacy Med Name: glipiZIDE 10 MG TABLET] 90 tablet 1     Sig: TAKE 1 TABLET BY MOUTH DAILY      Last office visit with prescribing clinician: 6/4/2024   Last telemedicine visit with prescribing clinician: Visit date not found   Next office visit with prescribing clinician: 5/12/2025                         Would you like a call back once the refill request has been completed: [] Yes [] No    If the office needs to give you a call back, can they leave a voicemail: [] Yes [] No    Amalia Singletary MA  02/21/25, 07:46 EST

## 2025-02-21 NOTE — PROGRESS NOTES
Chief Complaint  Bilateral carotid artery stenosis    Subjective        Dennis Arnold presents to [facility]  HPI   Dennis Arnold is a 74 y.o. male that has been followed in our office for carotid artery stenosis.  He has a history of a right hemispheric stroke with left Heema paresis and speech impediment after a cardiac catheterization in April 2023.  He returns today in follow up along with a carotid duplex. He  reports he has been doing well without hospitalizations or surgeries. He denies any symptoms consistent with CVA, TIA, or amaurosis fugax.     Review of Systems   Constitutional:  Negative for fever.   Eyes:  Negative for visual disturbance.   Cardiovascular:  Negative for leg swelling.   Gastrointestinal:  Negative for abdominal pain.   Musculoskeletal:  Negative for back pain.   Skin:  Negative for color change, pallor and wound.   Neurological:  Negative for dizziness, facial asymmetry, speech difficulty and weakness.        Dennis Arnold  reports that he has never smoked. He has never been exposed to tobacco smoke. His smokeless tobacco use includes chew..        Objective   Vital Signs:  Vitals:    02/21/25 1337   BP: 138/76      Body mass index is 29.52 kg/m².   BMI is >= 25 and <30. (Overweight) The following options were offered after discussion;: information on healthy weight added to patient's after visit summary        Physical Exam  Vitals reviewed.   Constitutional:       Appearance: Normal appearance.   HENT:      Head: Normocephalic.   Cardiovascular:      Rate and Rhythm: Normal rate and regular rhythm.      Pulses: Normal pulses.           Dorsalis pedis pulses are 3+ on the right side and 3+ on the left side.        Posterior tibial pulses are 3+ on the right side and 3+ on the left side.   Pulmonary:      Effort: Pulmonary effort is normal.   Skin:     General: Skin is warm.   Neurological:      General: No focal deficit present.      Mental Status:  He is alert and oriented to person, place, and time.   Psychiatric:         Mood and Affect: Mood normal.          Result Review :      Previous carotid duplex: Duplex Carotid Ultrasound CAR (07/19/2024 14:30)     Carotid duplex from today: Duplex Carotid Ultrasound CAR (02/21/2025 13:28)                    Assessment and Plan     Diagnoses and all orders for this visit:    1. Bilateral carotid artery stenosis (Primary)    2. Carotid stenosis, bilateral  -     Duplex Carotid Ultrasound CAR; Future             Patient present today for follow up of carotid artery stenosis.  He has a less than 50% stenosis on the right and a 50 to 69% stenosis on the left.  It is unchanged from previous imaging.  He is to continue his antiplatelet agent which is aspirin.  He is also on Eliquis.  He is on a statin for cholesterol control.  He is also on Repatha. We discussed adequate blood pressure control. He will return in 6 months along with a repeat carotid artery duplex.    Follow Up     Return in about 6 months (around 8/21/2025) for carotid duplex.  Patient was given instructions and counseling regarding his condition or for health maintenance advice. Please see specific information pulled into the AVS if appropriate.     STEPHANIE Soler

## 2025-03-31 RX ORDER — ATORVASTATIN CALCIUM 80 MG/1
80 TABLET, FILM COATED ORAL DAILY
Qty: 90 TABLET | Refills: 0 | Status: SHIPPED | OUTPATIENT
Start: 2025-03-31

## 2025-03-31 RX ORDER — LISINOPRIL 20 MG/1
20 TABLET ORAL DAILY
Qty: 90 TABLET | Refills: 0 | Status: SHIPPED | OUTPATIENT
Start: 2025-03-31

## 2025-03-31 NOTE — TELEPHONE ENCOUNTER
Rx Refill Note  Requested Prescriptions     Pending Prescriptions Disp Refills    atorvastatin (LIPITOR) 80 MG tablet [Pharmacy Med Name: ATORVASTATIN 80 MG TABLET] 90 tablet 2     Sig: TAKE 1 TABLET BY MOUTH DAILY      Last office visit with prescribing clinician: 6/4/2024   Last telemedicine visit with prescribing clinician: Visit date not found   Next office visit with prescribing clinician: 5/12/2025                         Would you like a call back once the refill request has been completed: [] Yes [] No    If the office needs to give you a call back, can they leave a voicemail: [] Yes [] No    Amalia Singletary MA  03/31/25, 08:11 EDT

## 2025-03-31 NOTE — TELEPHONE ENCOUNTER
Rx Refill Note  Requested Prescriptions     Pending Prescriptions Disp Refills    lisinopril (PRINIVIL,ZESTRIL) 20 MG tablet [Pharmacy Med Name: LISINOPRIL 20 MG TABLET] 90 tablet 2     Sig: TAKE 1 TABLET BY MOUTH DAILY      Last office visit with prescribing clinician: 6/4/2024   Last telemedicine visit with prescribing clinician: Visit date not found   Next office visit with prescribing clinician: 3/30/2025                         Would you like a call back once the refill request has been completed: [] Yes [] No    If the office needs to give you a call back, can they leave a voicemail: [] Yes [] No    Amalia Singletary MA  03/31/25, 08:10 EDT

## 2025-05-05 ENCOUNTER — LAB (OUTPATIENT)
Dept: ENDOCRINOLOGY | Age: 75
End: 2025-05-05
Payer: MEDICARE

## 2025-05-05 DIAGNOSIS — I10 ESSENTIAL HYPERTENSION: ICD-10-CM

## 2025-05-05 DIAGNOSIS — E78.5 HYPERLIPIDEMIA, UNSPECIFIED HYPERLIPIDEMIA TYPE: ICD-10-CM

## 2025-05-05 DIAGNOSIS — R80.9 TYPE 2 DIABETES MELLITUS WITH ALBUMINURIA: ICD-10-CM

## 2025-05-05 DIAGNOSIS — E11.29 TYPE 2 DIABETES MELLITUS WITH ALBUMINURIA: ICD-10-CM

## 2025-05-05 LAB
ALBUMIN SERPL-MCNC: 4.1 G/DL (ref 3.5–5.2)
ALBUMIN/GLOB SERPL: 1.6 G/DL
ALP SERPL-CCNC: 103 U/L (ref 39–117)
ALT SERPL-CCNC: 19 U/L (ref 1–41)
AST SERPL-CCNC: 24 U/L (ref 1–40)
BILIRUB SERPL-MCNC: 0.7 MG/DL (ref 0–1.2)
BUN SERPL-MCNC: 20 MG/DL (ref 8–23)
BUN/CREAT SERPL: 23.5 (ref 7–25)
CALCIUM SERPL-MCNC: 9.1 MG/DL (ref 8.6–10.5)
CHLORIDE SERPL-SCNC: 103 MMOL/L (ref 98–107)
CHOLEST SERPL-MCNC: 98 MG/DL (ref 0–200)
CO2 SERPL-SCNC: 29 MMOL/L (ref 22–29)
CREAT SERPL-MCNC: 0.85 MG/DL (ref 0.76–1.27)
EGFRCR SERPLBLD CKD-EPI 2021: 91.8 ML/MIN/1.73
GLOBULIN SER CALC-MCNC: 2.6 GM/DL
GLUCOSE SERPL-MCNC: 147 MG/DL (ref 65–99)
HBA1C MFR BLD: 6.5 % (ref 4.8–5.6)
HDLC SERPL-MCNC: 62 MG/DL (ref 40–60)
IMP & REVIEW OF LAB RESULTS: NORMAL
LDLC SERPL CALC-MCNC: 21 MG/DL (ref 0–100)
POTASSIUM SERPL-SCNC: 3.3 MMOL/L (ref 3.5–5.2)
PROT SERPL-MCNC: 6.7 G/DL (ref 6–8.5)
SODIUM SERPL-SCNC: 145 MMOL/L (ref 136–145)
TRIGL SERPL-MCNC: 69 MG/DL (ref 0–150)
UNABLE TO VOID: NORMAL
VLDLC SERPL CALC-MCNC: 15 MG/DL (ref 5–40)

## 2025-05-12 ENCOUNTER — OFFICE VISIT (OUTPATIENT)
Dept: ENDOCRINOLOGY | Age: 75
End: 2025-05-12
Payer: MEDICARE

## 2025-05-12 VITALS
TEMPERATURE: 97.6 F | DIASTOLIC BLOOD PRESSURE: 62 MMHG | HEART RATE: 88 BPM | SYSTOLIC BLOOD PRESSURE: 118 MMHG | HEIGHT: 69 IN | BODY MASS INDEX: 29.52 KG/M2 | OXYGEN SATURATION: 95 %

## 2025-05-12 DIAGNOSIS — Z86.73 HISTORY OF STROKE: ICD-10-CM

## 2025-05-12 DIAGNOSIS — I25.10 CORONARY ARTERY DISEASE INVOLVING NATIVE CORONARY ARTERY OF NATIVE HEART WITHOUT ANGINA PECTORIS: ICD-10-CM

## 2025-05-12 DIAGNOSIS — Z85.46 HISTORY OF PROSTATE CANCER: ICD-10-CM

## 2025-05-12 DIAGNOSIS — I65.23 BILATERAL CAROTID ARTERY STENOSIS: ICD-10-CM

## 2025-05-12 DIAGNOSIS — G47.33 OBSTRUCTIVE SLEEP APNEA SYNDROME: ICD-10-CM

## 2025-05-12 DIAGNOSIS — R80.9 TYPE 2 DIABETES MELLITUS WITH ALBUMINURIA: Primary | ICD-10-CM

## 2025-05-12 DIAGNOSIS — E11.29 TYPE 2 DIABETES MELLITUS WITH ALBUMINURIA: Primary | ICD-10-CM

## 2025-05-12 DIAGNOSIS — E78.5 HYPERLIPIDEMIA, UNSPECIFIED HYPERLIPIDEMIA TYPE: ICD-10-CM

## 2025-05-12 DIAGNOSIS — I10 ESSENTIAL HYPERTENSION: ICD-10-CM

## 2025-05-12 RX ORDER — ATORVASTATIN CALCIUM 10 MG/1
10 TABLET, FILM COATED ORAL DAILY
COMMUNITY
Start: 2025-04-01

## 2025-05-12 NOTE — PROGRESS NOTES
Subjective   Dennis Arnold is a 74 y.o. male.     History of Present Illness     Patient has known diabetes mellitus since 2010.  He has been on glipizide 10 mg/day,  pioglitazone 45 mg/day, Novolog sliding scale 2 units per 50 above 150, and Tresiba 18 units every evening.  Hemoglobin A1c done in May 2025 is 6.5%.    Metformin was discontinued in April 2023 when he was admitted for stroke.  He stopped Trulicity because of fatigue and nausea.  He did not start on Jardiance, or Januvia because of a high co-pay.  He stopped Invokana after 3 month because of a high co-pay.  He is using a freestyle priya 2 sensor.  He has few hypoglycemic episodes.      Freestyle priya 2 data from April 29, 2025 to May 12, 2025 reviewed.  Average glucose 155 mg per DL.  GMI 7.0%.  Time in range 78%.  Time above range 22%.  Time below range 0  He has no hypoglycemia in the early morning.  Fasting glucose in the 90s to the 150s.  He has occasional postprandial hyperglycemia at lunchtime.     His last eye examination was in 2/24.  He has bilateral retinopathy without macular edema.  He denies blurry vision.  He denies any tingling or burning in his hands or feet.   He has numbness on his left hand after the stroke.  He has albuminuria on urine sample taken in 12/22.  He is on lisinopril.     He has known coronary artery disease and hypertension.  He had a previous three-vessel coronary artery bypass and angioplasty with stent. He had paroxysmal atrial fibrillation after he had a stroke in 4/23.   He is on lisinopril.  He denies chest pain or shortness of breath.  He follows with Dr. Landon Wolfe.     He had a stroke with left-sided weakness in April 2023.      He has bilateral carotid stenosis and was told to have an 80% stenosis on the left side during his admission in April 2023.  Carotid ultrasound done in February 2025 showed 50 to 69% stenosis of the left internal carotid artery and less than 50% stenosis of the right internal carotid  "artery.  He follows with Dr. Dover.     He has hyperlipidemia and is on atorvastatin 10 mg/day, Repatha 140 mg every 2 weeks and fish oil 1200 mg/day.  Zetia was discontinued when Repatha was started.  Vascepa was discontinued because of high cost.  He denies any myalgia.    Lipid panel done in May 2025 as follows: Cholesterol 98.  HDL 62.  LDL 21.  Triglycerides 69.     He was diagnosed to have prostate cancer in 2019.  He has completed external radiation therapy.  He has stopped androgen deprivation therapy.  He is scheduled for lithotripsy next week.  He follows with Dr. Lee.     He has never had a colonoscopy.  He denies bowel complaints.  He denies melena or hematochezia.       He was diagnosed to have sleep apnea in 2024 and he is not using his CPAP.     He does not smoke cigarettes.  He has stopped chewing tobacco since he had a stroke in April 2023.    The following portions of the patient's history were reviewed and updated as appropriate: allergies, current medications, past family history, past medical history, past social history, past surgical history, and problem list.    Review of Systems   Eyes:  Negative for visual disturbance.   Respiratory:  Negative for shortness of breath and wheezing.    Cardiovascular:  Negative for chest pain and palpitations.   Gastrointestinal: Negative.    Genitourinary:  Positive for dysuria and hematuria.   Musculoskeletal:  Negative for myalgias.   Neurological:  Positive for numbness.     Vitals:    05/12/25 1125   BP: 118/62   Pulse: 88   Temp: 97.6 °F (36.4 °C)   TempSrc: Oral   SpO2: 95%   Height: 175.3 cm (69.02\")      Objective   Physical Exam  Constitutional:       Appearance: Normal appearance. He is not toxic-appearing or diaphoretic.   Neck:      Vascular: No carotid bruit.   Cardiovascular:      Rate and Rhythm: Normal rate and regular rhythm.      Heart sounds: Normal heart sounds.      No gallop.   Musculoskeletal:      Right lower leg: Edema present. "      Left lower leg: Edema present.      Comments: Feet warm.  No cyanosis or clubbing.  No plantar ulcers.  No calf tenderness.   Lymphadenopathy:      Cervical: No cervical adenopathy.   Neurological:      Mental Status: He is alert and oriented to person, place, and time.      Comments: Intact light touch in both feet.       Lab on 05/05/2025   Component Date Value Ref Range Status    Hemoglobin A1C 05/05/2025 6.50 (H)  4.80 - 5.60 % Final    Comment: Hemoglobin A1C Ranges:  Increased Risk for Diabetes  5.7% to 6.4%  Diabetes                     >= 6.5%  Diabetic Goal                < 7.0%      Glucose 05/05/2025 147 (H)  65 - 99 mg/dL Final    BUN 05/05/2025 20  8 - 23 mg/dL Final    Creatinine 05/05/2025 0.85  0.76 - 1.27 mg/dL Final    EGFR Result 05/05/2025 91.8  >60.0 mL/min/1.73 Final    Comment: GFR Categories in Chronic Kidney Disease (CKD)  GFR Category          GFR (mL/min/1.73)    Interpretation  G1                    90 or greater        Normal or high  (1)  G2                    60-89                Mild decrease  (1)  G3a                   45-59                Mild to moderate  decrease  G3b                   30-44                Moderate to  severe decrease  G4                    15-29                Severe decrease  G5                    14 or less           Kidney failure  (1)In the absence of evidence of kidney disease, neither  GFR category G1 or G2 fulfill the criteria for CKD.  eGFR calculation 2021 CKD-EPI creatinine equation, which  does not include race as a factor      BUN/Creatinine Ratio 05/05/2025 23.5  7.0 - 25.0 Final    Sodium 05/05/2025 145  136 - 145 mmol/L Final    Potassium 05/05/2025 3.3 (L)  3.5 - 5.2 mmol/L Final    Chloride 05/05/2025 103  98 - 107 mmol/L Final    Total CO2 05/05/2025 29.0  22.0 - 29.0 mmol/L Final    Calcium 05/05/2025 9.1  8.6 - 10.5 mg/dL Final    Total Protein 05/05/2025 6.7  6.0 - 8.5 g/dL Final    Albumin 05/05/2025 4.1  3.5 - 5.2 g/dL Final     Globulin 05/05/2025 2.6  gm/dL Final    A/G Ratio 05/05/2025 1.6  g/dL Final    Total Bilirubin 05/05/2025 0.7  0.0 - 1.2 mg/dL Final    Alkaline Phosphatase 05/05/2025 103  39 - 117 U/L Final    AST (SGOT) 05/05/2025 24  1 - 40 U/L Final    ALT (SGPT) 05/05/2025 19  1 - 41 U/L Final    Total Cholesterol 05/05/2025 98  0 - 200 mg/dL Final    Comment: Cholesterol Reference Ranges  (U.S. Department of Health and Human Services ATP III  Classifications)  Desirable          <200 mg/dL  Borderline High    200-239 mg/dL  High Risk          >240 mg/dL  Triglyceride Reference Ranges  (U.S. Department of Health and Human Services ATP III  Classifications)  Normal           <150 mg/dL  Borderline High  150-199 mg/dL  High             200-499 mg/dL  Very High        >500 mg/dL  HDL Reference Ranges  (U.S. Department of Health and Human Services ATP III  Classifications)  Low     <40 mg/dl (major risk factor for CHD)  High    >60 mg/dl ('negative' risk factor for CHD)  LDL Reference Ranges  (U.S. Department of Health and Human Services ATP III  Classifications)  Optimal          <100 mg/dL  Near Optimal     100-129 mg/dL  Borderline High  130-159 mg/dL  High             160-189 mg/dL  Very High        >189 mg/dL  LDL is calculated using the NIH LDL-C calculation.      Triglycerides 05/05/2025 69  0 - 150 mg/dL Final    HDL Cholesterol 05/05/2025 62 (H)  40 - 60 mg/dL Final    VLDL Cholesterol [place] 05/05/2025 15  5 - 40 mg/dL Final    LDL Chol Calc (NIH) 05/05/2025 21  0 - 100 mg/dL Final    Unable to Void 05/05/2025 Comment   Final    Comment: The patient was not able to render a urine sample and has been  instructed to return for a urine collection at their earliest  convenience.  The urine testing that you have requested has  been deleted from this report.  When the patient returns and  provides a urine specimen, the urine testing will be performed  and separately reported.      Interpretation 05/05/2025 Note   Final     Supplemental report is available.     Assessment & Plan   Diagnoses and all orders for this visit:    1. 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 Coronary artery disease involving native coronary artery of native heart without angina pectoris    3. Essential hypertension  -     Comprehensive Metabolic Panel  -     Aldosterone / Renin Ratio    4. History of stroke    5. Bilateral carotid artery stenosis    6. Hyperlipidemia, unspecified hyperlipidemia type    7. History of prostate cancer    8. Obstructive sleep apnea syndrome      Continue Tresiba 18 units every evening, NovoLog as needed, glipizide 10 mg/day, and pioglitazone 45 mg/day.  Advised to have an eye exam in the near future and yearly thereafter.    Continue lisinopril.    Continue Repatha 140 mg every 2 weeks, atorvastatin 10 mg/day, and fish oil 1200 mg/day.    Follow-up with Dr. Dover and Dr. Samuel.      Suggest colon cancer screening.    Advised to follow-up with sleep specialist.    Copy of my note sent to Chito Shaw NP, Dr. Dover, Dr. Lee and Dr. Landon Wolfe.    RTC 4 mos with REINA Clifford NP

## 2025-06-03 DIAGNOSIS — R80.9 TYPE 2 DIABETES MELLITUS WITH ALBUMINURIA: ICD-10-CM

## 2025-06-03 DIAGNOSIS — E11.29 TYPE 2 DIABETES MELLITUS WITH ALBUMINURIA: ICD-10-CM

## 2025-06-03 RX ORDER — INSULIN DEGLUDEC 100 U/ML
18 INJECTION, SOLUTION SUBCUTANEOUS DAILY
Qty: 15 ML | Refills: 0 | Status: SHIPPED | OUTPATIENT
Start: 2025-06-03

## 2025-06-03 NOTE — TELEPHONE ENCOUNTER
Rx Refill Note  Requested Prescriptions     Pending Prescriptions Disp Refills    insulin degludec (Tresiba FlexTouch) 100 UNIT/ML solution pen-injector injection       Sig: Inject 18 Units under the skin into the appropriate area as directed Daily.      Last office visit with prescribing clinician: 5/12/2025   Last telemedicine visit with prescribing clinician: Visit date not found   Next office visit with prescribing clinician: Visit date not found                         Would you like a call back once the refill request has been completed: [] Yes [] No    If the office needs to give you a call back, can they leave a voicemail: [] Yes [] No    Denisse Singletary  06/03/25, 09:56 EDT  Denisse Singletary  6/3/2025  09:56 EDT

## 2025-07-07 NOTE — TELEPHONE ENCOUNTER
Rx Refill Note  Requested Prescriptions     Pending Prescriptions Disp Refills    lisinopril (PRINIVIL,ZESTRIL) 20 MG tablet 90 tablet 0     Sig: Take 1 tablet by mouth Daily.      Last office visit with prescribing clinician: 5/12/2025   Last telemedicine visit with prescribing clinician: Visit date not found   Next office visit with prescribing clinician: Visit date not found                         Would you like a call back once the refill request has been completed: [] Yes [] No    If the office needs to give you a call back, can they leave a voicemail: [] Yes [] No  Amalia Singletary MA  7/7/2025  11:23 EDT      Nsaids Pregnancy And Lactation Text: These medications are considered safe up to 30 weeks gestation. It is excreted in breast milk.

## 2025-07-08 RX ORDER — ATORVASTATIN CALCIUM 10 MG/1
10 TABLET, FILM COATED ORAL NIGHTLY
OUTPATIENT
Start: 2025-07-08

## 2025-07-08 RX ORDER — LISINOPRIL 20 MG/1
20 TABLET ORAL DAILY
Qty: 90 TABLET | Refills: 0 | OUTPATIENT
Start: 2025-07-08

## 2025-07-08 NOTE — TELEPHONE ENCOUNTER
Called and spoke with pt's wife. Pt's wife stated this is an automatic refill. Pt's wife stated the pt is taking 10mg but is currently in a nursing facility and does not need refills on any medications. Pt's wife will contact the office upon discharge to get all meds refilled.